# Patient Record
Sex: FEMALE | Race: WHITE | NOT HISPANIC OR LATINO | Employment: OTHER | URBAN - METROPOLITAN AREA
[De-identification: names, ages, dates, MRNs, and addresses within clinical notes are randomized per-mention and may not be internally consistent; named-entity substitution may affect disease eponyms.]

---

## 2022-10-29 PROBLEM — F02.80 ALZHEIMER'S DEMENTIA (HCC): Status: ACTIVE | Noted: 2022-10-29

## 2022-10-29 PROBLEM — E53.8 VITAMIN B12 DEFICIENCY: Status: ACTIVE | Noted: 2022-10-29

## 2022-10-29 PROBLEM — Z85.42 HISTORY OF UTERINE CANCER: Status: ACTIVE | Noted: 2022-10-29

## 2022-10-29 PROBLEM — G30.9 ALZHEIMER'S DEMENTIA (HCC): Status: ACTIVE | Noted: 2022-10-29

## 2023-01-25 ENCOUNTER — HOSPITAL ENCOUNTER (EMERGENCY)
Facility: MEDICAL CENTER | Age: 78
End: 2023-01-25
Attending: EMERGENCY MEDICINE
Payer: MEDICARE

## 2023-01-25 ENCOUNTER — APPOINTMENT (OUTPATIENT)
Dept: RADIOLOGY | Facility: MEDICAL CENTER | Age: 78
End: 2023-01-25
Attending: EMERGENCY MEDICINE
Payer: MEDICARE

## 2023-01-25 VITALS
OXYGEN SATURATION: 96 % | BODY MASS INDEX: 26.68 KG/M2 | WEIGHT: 141.31 LBS | HEART RATE: 77 BPM | HEIGHT: 61 IN | SYSTOLIC BLOOD PRESSURE: 150 MMHG | DIASTOLIC BLOOD PRESSURE: 68 MMHG | RESPIRATION RATE: 16 BRPM | TEMPERATURE: 98.1 F

## 2023-01-25 DIAGNOSIS — R55 NEAR SYNCOPE: ICD-10-CM

## 2023-01-25 LAB
ALBUMIN SERPL BCP-MCNC: 3.4 G/DL (ref 3.2–4.9)
ALBUMIN/GLOB SERPL: 1.7 G/DL
ALP SERPL-CCNC: 90 U/L (ref 30–99)
ALT SERPL-CCNC: 10 U/L (ref 2–50)
ANION GAP SERPL CALC-SCNC: 8 MMOL/L (ref 7–16)
APPEARANCE UR: CLEAR
AST SERPL-CCNC: 14 U/L (ref 12–45)
BASOPHILS # BLD AUTO: 0.3 % (ref 0–1.8)
BASOPHILS # BLD: 0.02 K/UL (ref 0–0.12)
BILIRUB SERPL-MCNC: 0.2 MG/DL (ref 0.1–1.5)
BILIRUB UR QL STRIP.AUTO: NEGATIVE
BUN SERPL-MCNC: 30 MG/DL (ref 8–22)
CALCIUM ALBUM COR SERPL-MCNC: 8.9 MG/DL (ref 8.5–10.5)
CALCIUM SERPL-MCNC: 8.4 MG/DL (ref 8.4–10.2)
CHLORIDE SERPL-SCNC: 108 MMOL/L (ref 96–112)
CO2 SERPL-SCNC: 24 MMOL/L (ref 20–33)
COLOR UR: YELLOW
CREAT SERPL-MCNC: 0.65 MG/DL (ref 0.5–1.4)
EKG IMPRESSION: NORMAL
EOSINOPHIL # BLD AUTO: 0.07 K/UL (ref 0–0.51)
EOSINOPHIL NFR BLD: 1.1 % (ref 0–6.9)
ERYTHROCYTE [DISTWIDTH] IN BLOOD BY AUTOMATED COUNT: 46.4 FL (ref 35.9–50)
GFR SERPLBLD CREATININE-BSD FMLA CKD-EPI: 89 ML/MIN/1.73 M 2
GLOBULIN SER CALC-MCNC: 2 G/DL (ref 1.9–3.5)
GLUCOSE SERPL-MCNC: 117 MG/DL (ref 65–99)
GLUCOSE UR STRIP.AUTO-MCNC: NEGATIVE MG/DL
HCT VFR BLD AUTO: 37.9 % (ref 37–47)
HGB BLD-MCNC: 12.7 G/DL (ref 12–16)
IMM GRANULOCYTES # BLD AUTO: 0.03 K/UL (ref 0–0.11)
IMM GRANULOCYTES NFR BLD AUTO: 0.5 % (ref 0–0.9)
KETONES UR STRIP.AUTO-MCNC: ABNORMAL MG/DL
LEUKOCYTE ESTERASE UR QL STRIP.AUTO: NEGATIVE
LYMPHOCYTES # BLD AUTO: 0.7 K/UL (ref 1–4.8)
LYMPHOCYTES NFR BLD: 10.6 % (ref 22–41)
MCH RBC QN AUTO: 31.6 PG (ref 27–33)
MCHC RBC AUTO-ENTMCNC: 33.5 G/DL (ref 33.6–35)
MCV RBC AUTO: 94.3 FL (ref 81.4–97.8)
MICRO URNS: ABNORMAL
MONOCYTES # BLD AUTO: 0.44 K/UL (ref 0–0.85)
MONOCYTES NFR BLD AUTO: 6.6 % (ref 0–13.4)
NEUTROPHILS # BLD AUTO: 5.36 K/UL (ref 2–7.15)
NEUTROPHILS NFR BLD: 80.9 % (ref 44–72)
NITRITE UR QL STRIP.AUTO: NEGATIVE
NRBC # BLD AUTO: 0 K/UL
NRBC BLD-RTO: 0 /100 WBC
PH UR STRIP.AUTO: 5.5 [PH] (ref 5–8)
PLATELET # BLD AUTO: 205 K/UL (ref 164–446)
PMV BLD AUTO: 10.4 FL (ref 9–12.9)
POTASSIUM SERPL-SCNC: 3.7 MMOL/L (ref 3.6–5.5)
PROT SERPL-MCNC: 5.4 G/DL (ref 6–8.2)
PROT UR QL STRIP: NEGATIVE MG/DL
RBC # BLD AUTO: 4.02 M/UL (ref 4.2–5.4)
RBC UR QL AUTO: NEGATIVE
SODIUM SERPL-SCNC: 140 MMOL/L (ref 135–145)
SP GR UR STRIP.AUTO: >=1.03
TROPONIN T SERPL-MCNC: 9 NG/L (ref 6–19)
TROPONIN T SERPL-MCNC: 9 NG/L (ref 6–19)
WBC # BLD AUTO: 6.6 K/UL (ref 4.8–10.8)

## 2023-01-25 PROCEDURE — 93005 ELECTROCARDIOGRAM TRACING: CPT | Performed by: EMERGENCY MEDICINE

## 2023-01-25 PROCEDURE — 93005 ELECTROCARDIOGRAM TRACING: CPT

## 2023-01-25 PROCEDURE — 36415 COLL VENOUS BLD VENIPUNCTURE: CPT

## 2023-01-25 PROCEDURE — 71045 X-RAY EXAM CHEST 1 VIEW: CPT

## 2023-01-25 PROCEDURE — 85025 COMPLETE CBC W/AUTO DIFF WBC: CPT

## 2023-01-25 PROCEDURE — 84484 ASSAY OF TROPONIN QUANT: CPT

## 2023-01-25 PROCEDURE — 81003 URINALYSIS AUTO W/O SCOPE: CPT

## 2023-01-25 PROCEDURE — 80053 COMPREHEN METABOLIC PANEL: CPT

## 2023-01-25 PROCEDURE — 99284 EMERGENCY DEPT VISIT MOD MDM: CPT

## 2023-01-25 RX ORDER — NAPROXEN SODIUM 220 MG
220 TABLET ORAL 2 TIMES DAILY PRN
Status: SHIPPED | COMMUNITY
End: 2023-08-05

## 2023-01-25 ASSESSMENT — FIBROSIS 4 INDEX: FIB4 SCORE: 2.22

## 2023-01-25 NOTE — ED PROVIDER NOTES
ED Provider Note    CHIEF COMPLAINT  Chief Complaint   Patient presents with    Near Syncopal     Pt. Endorses near syncopal episode this morning, endorses feeling dizzy, nauseous. Denies full syncopal episode. States she did eat breakfast. Denies CP or h/a currently or with episode.        EXTERNAL RECORDS REVIEWED  Outpatient Notes office visit of 10/28/2022 which was establishment of care up in [facility]    HPI/ROS  LIMITATION TO HISTORY   Select: : None  OUTSIDE HISTORIAN(S):  Family daughter at bedside    Jennifer Vuong is a 77 y.o. female who presents to the ED with concerns for near syncopal event.  The patient just moved to this area and is living with her daughter because of worsening memory problems.  Patient apparently does have a history of mild dementia that is progressive.  History of coronary disease as well as history of uterine cancer in the past.  The patient was doing very well today they went to [place] she had an episode where she just felt very weak dizzy and needed to sit down.  The patient did not feel well because of this the daughter was concerned and brought her immediately to the emergency department for evaluation.  Upon arrival here patient states that she is feeling better she does recollect the event denies any overt chest pain shortness of breath just felt very nauseated and very lightheaded.  Currently she denies any other symptoms.    PAST MEDICAL HISTORY   has a past medical history of Anxiety, Arthritis, Cancer (HCC), Cataract, Heart murmur, Hypertension, and Urinary tract infection.    SURGICAL HISTORY   has a past surgical history that includes arthroplasty and abdominal hysterectomy total.    FAMILY HISTORY  No family history on file.    SOCIAL HISTORY  Social History     Tobacco Use    Smoking status: Never    Smokeless tobacco: Never   Vaping Use    Vaping Use: Never used   Substance and Sexual Activity    Alcohol use: Yes     Alcohol/week: 0.6 oz      "Types: 1 Glasses of wine per week     Comment: once a week    Drug use: Never    Sexual activity: Not Currently       CURRENT MEDICATIONS  Home Medications       Reviewed by Anne Bang (Pharmacy Tech) on 01/25/23 at 1226  Med List Status: Complete     Medication Last Dose Status   aspirin EC (ECOTRIN) 81 MG Tablet Delayed Response 1/24/2023 Active   atorvastatin (LIPITOR) 20 MG Tab 1/24/2023 Active   Cyanocobalamin ([employer] B12 GUMMIES PO) 1/24/2023 Active   memantine (NAMENDA) 5 MG Tab 1/24/2023 Active   naproxen (ALEVE) 220 MG tablet > 3 days Active                    ALLERGIES  No Known Allergies    PHYSICAL EXAM  VITAL SIGNS: BP (!) 150/68   Pulse 77   Temp 36.7 °C (98.1 °F) (Temporal)   Resp 16   Ht 1.549 m (5' 1\")   Wt 64.1 kg (141 lb 5 oz)   SpO2 96%   BMI 26.70 kg/m²    Constitutional: Well-developed no acute distress elderly in appearance  HENT: Normocephalic, Atraumatic, Bilateral external ears normal.  Eyes:  conjunctiva are normal.   Neck: Supple.  Nontender midline  Cardiovascular: Regular rate and rhythm grade 3/6 murmur left sternal border no gallops or rubs.   Thorax & Lungs: No respiratory distress. Breathing comfortably. Lungs are clear to auscultation bilaterally, there are no wheezes no rales. Chest wall is nontender.  Abdomen: Soft, nontender nondistended.  Skin: Warm, Dry, No erythema,   Back: No tenderness, No CVA tenderness.  Musculoskeletal: No clubbing cyanosis or edema good range of motion   Neurologic: Alert & oriented x 3, normal sensation moving all extremities appears normal   Psychiatric: Affect normal, Judgment normal, Mood normal.       DIAGNOSTIC STUDIES / PROCEDURES  EKG  I have independently interpreted this EKG  Interpreted below by myself    LABS  Results for orders placed or performed during the hospital encounter of 01/25/23   CBC with Differential   Result Value Ref Range    WBC 6.6 4.8 - 10.8 K/uL    RBC 4.02 (L) 4.20 - 5.40 M/uL    Hemoglobin 12.7 12.0 - " 16.0 g/dL    Hematocrit 37.9 37.0 - 47.0 %    MCV 94.3 81.4 - 97.8 fL    MCH 31.6 27.0 - 33.0 pg    MCHC 33.5 (L) 33.6 - 35.0 g/dL    RDW 46.4 35.9 - 50.0 fL    Platelet Count 205 164 - 446 K/uL    MPV 10.4 9.0 - 12.9 fL    Neutrophils-Polys 80.90 (H) 44.00 - 72.00 %    Lymphocytes 10.60 (L) 22.00 - 41.00 %    Monocytes 6.60 0.00 - 13.40 %    Eosinophils 1.10 0.00 - 6.90 %    Basophils 0.30 0.00 - 1.80 %    Immature Granulocytes 0.50 0.00 - 0.90 %    Nucleated RBC 0.00 /100 WBC    Neutrophils (Absolute) 5.36 2.00 - 7.15 K/uL    Lymphs (Absolute) 0.70 (L) 1.00 - 4.80 K/uL    Monos (Absolute) 0.44 0.00 - 0.85 K/uL    Eos (Absolute) 0.07 0.00 - 0.51 K/uL    Baso (Absolute) 0.02 0.00 - 0.12 K/uL    Immature Granulocytes (abs) 0.03 0.00 - 0.11 K/uL    NRBC (Absolute) 0.00 K/uL   Complete Metabolic Panel (CMP)   Result Value Ref Range    Sodium 140 135 - 145 mmol/L    Potassium 3.7 3.6 - 5.5 mmol/L    Chloride 108 96 - 112 mmol/L    Co2 24 20 - 33 mmol/L    Anion Gap 8.0 7.0 - 16.0    Glucose 117 (H) 65 - 99 mg/dL    Bun 30 (H) 8 - 22 mg/dL    Creatinine 0.65 0.50 - 1.40 mg/dL    Calcium 8.4 8.4 - 10.2 mg/dL    AST(SGOT) 14 12 - 45 U/L    ALT(SGPT) 10 2 - 50 U/L    Alkaline Phosphatase 90 30 - 99 U/L    Total Bilirubin 0.2 0.1 - 1.5 mg/dL    Albumin 3.4 3.2 - 4.9 g/dL    Total Protein 5.4 (L) 6.0 - 8.2 g/dL    Globulin 2.0 1.9 - 3.5 g/dL    A-G Ratio 1.7 g/dL   Troponin STAT   Result Value Ref Range    Troponin T 9 6 - 19 ng/L   Troponin in two (2) hours   Result Value Ref Range    Troponin T 9 6 - 19 ng/L   URINALYSIS,CULTURE IF INDICATED    Specimen: Urine   Result Value Ref Range    Color Yellow     Character Clear     Specific Gravity >=1.030 <1.035    Ph 5.5 5.0 - 8.0    Glucose Negative Negative mg/dL    Ketones Trace (A) Negative mg/dL    Protein Negative Negative mg/dL    Bilirubin Negative Negative    Nitrite Negative Negative    Leukocyte Esterase Negative Negative    Occult Blood Negative Negative    Micro  Urine Req see below    CORRECTED CALCIUM   Result Value Ref Range    Correct Calcium 8.9 8.5 - 10.5 mg/dL   ESTIMATED GFR   Result Value Ref Range    GFR (CKD-EPI) 89 >60 mL/min/1.73 m 2   EKG   Result Value Ref Range    Report       [facility]    Test Date:  2023  Pt Name:    MARTINA CALDERA                   Department: [facility]  MRN:        [id number]                      [facility]  Gender:     Female                       Technician: DARA  :        [id number]                   Requested By:ER TRIAGE PROTOCOL  Order #:    [id number]                    Reading MD:    Measurements  Intervals                                Axis  Rate:       69                           P:          -22  MT:         159                          QRS:        -47  QRSD:       94                           T:          31  QT:         419  QTc:        449    Interpretive Statements  Sinus rhythm  Consider left atrial enlargement  Left anterior fascicular block  Left ventricular hypertrophy  No previous ECG available for comparison           RADIOLOGY  I have independently interpreted the diagnostic imaging associated with this visit and am waiting the final reading from the radiologist.   My preliminary interpretation is a follows: No acute infiltrate  DX-CHEST-PORTABLE (1 VIEW)   Final Result         No acute cardiopulmonary process.          COURSE & MEDICAL DECISION MAKING    ED Observation Status? Yes; I am placing the patient in to an observation status due to a diagnostic uncertainty as well as therapeutic intensity. Patient placed in observation status at 12:40 PM, 2023.     Observation plan is as follows: At this point the patient is otherwise hemodynamically stable we we will observe for any signs of ectopy while we work-up for the potential of cardiogenic causes for her near syncopal event I will order laboratory studies to evaluate for metabolic status and a urinalysis just to  evaluate for urinary tract infection.    Upon Reevaluation, the patient's condition has: Improved; and will be discharged.    Patient discharged from ED Observation status at 3:06PM (Time) 1/25/2023 (Date).     INITIAL ASSESSMENT AND PLAN  Care Narrative: Patient presents emerged part for evaluation.  Clinically the patient is otherwise appropriate does have some memory problems and is accordingly at baseline mental status per the daughter.  Sounds that the patient may have had a near syncopal event currently she is asymptomatic she is normal tensive on her blood pressure.  EKG laboratory studies were drawn no significant abnormalities on her laboratory studies her urinalysis is also normal.  At this point it could very well have been a vasovagal event.  Clinically she is otherwise appropriate and well and I do feel the patient is stable for discharge.  Her urine did have greater than 1.030 a just mild dehydration.  I did not give her a fluid bolus she is able to tolerate p.o. fluids and she is tolerating p.o. fluids.  At this point I do feel the patient is stable for discharge.    ADDITIONAL PROBLEM LIST AND DISPOSITION      FINAL DIAGNOSIS  1. Near syncope          The patient will return for new or worsening symptoms and is stable at the time of discharge.    The patient is referred to a primary physician for blood pressure management, diabetic screening, and for all other preventative health concerns.        DISPOSITION:  Patient will be discharged home in stable condition.    FOLLOW UP:  Sunita Serrano M.D.  [address] [facility] [id number] [phone]    Schedule an appointment as soon as possible for a visit in 1 week  For re-check, Return if any symptoms worsen      OUTPATIENT MEDICATIONS:  Discharge Medication List as of 1/25/2023  3:11 PM              Electronically signed by: Franco Terrell M.D., 1/25/2023 12:38 PM

## 2023-08-05 PROBLEM — I25.10 CORONARY ARTERY DISEASE INVOLVING NATIVE HEART WITHOUT ANGINA PECTORIS: Status: ACTIVE | Noted: 2023-08-05

## 2023-08-05 PROBLEM — I35.0 AORTIC VALVE STENOSIS: Status: ACTIVE | Noted: 2023-08-05

## 2023-08-05 PROBLEM — E78.5 HYPERLIPIDEMIA: Status: ACTIVE | Noted: 2023-08-05

## 2024-06-17 PROBLEM — F33.9 MDD (MAJOR DEPRESSIVE DISORDER), RECURRENT EPISODE (HCC): Status: ACTIVE | Noted: 2024-06-17

## 2024-06-17 PROBLEM — E46 PROTEIN CALORIE MALNUTRITION (HCC): Status: ACTIVE | Noted: 2024-06-17

## 2024-06-17 PROBLEM — E55.9 VITAMIN D DEFICIENCY: Status: ACTIVE | Noted: 2024-06-17

## 2024-06-19 ENCOUNTER — OFFICE VISIT (OUTPATIENT)
Dept: URGENT CARE | Facility: CLINIC | Age: 79
End: 2024-06-19
Payer: MEDICARE

## 2024-06-19 ENCOUNTER — HOSPITAL ENCOUNTER (OUTPATIENT)
Facility: MEDICAL CENTER | Age: 79
End: 2024-06-19
Attending: NURSE PRACTITIONER
Payer: MEDICARE

## 2024-06-19 VITALS
WEIGHT: 154 LBS | HEART RATE: 65 BPM | DIASTOLIC BLOOD PRESSURE: 58 MMHG | BODY MASS INDEX: 29.07 KG/M2 | RESPIRATION RATE: 16 BRPM | OXYGEN SATURATION: 97 % | SYSTOLIC BLOOD PRESSURE: 108 MMHG | HEIGHT: 61 IN | TEMPERATURE: 97.6 F

## 2024-06-19 DIAGNOSIS — R35.0 URINARY FREQUENCY: ICD-10-CM

## 2024-06-19 DIAGNOSIS — R30.0 DYSURIA: ICD-10-CM

## 2024-06-19 DIAGNOSIS — F02.80 ALZHEIMER'S DEMENTIA, UNSPECIFIED DEMENTIA SEVERITY, UNSPECIFIED TIMING OF DEMENTIA ONSET, UNSPECIFIED WHETHER BEHAVIORAL, PSYCHOTIC, OR MOOD DISTURBANCE OR ANXIETY (HCC): ICD-10-CM

## 2024-06-19 DIAGNOSIS — G30.9 ALZHEIMER'S DEMENTIA, UNSPECIFIED DEMENTIA SEVERITY, UNSPECIFIED TIMING OF DEMENTIA ONSET, UNSPECIFIED WHETHER BEHAVIORAL, PSYCHOTIC, OR MOOD DISTURBANCE OR ANXIETY (HCC): ICD-10-CM

## 2024-06-19 LAB
APPEARANCE UR: CLEAR
BILIRUB UR STRIP-MCNC: NORMAL MG/DL
COLOR UR AUTO: NORMAL
GLUCOSE UR STRIP.AUTO-MCNC: NORMAL MG/DL
KETONES UR STRIP.AUTO-MCNC: NORMAL MG/DL
LEUKOCYTE ESTERASE UR QL STRIP.AUTO: NORMAL
NITRITE UR QL STRIP.AUTO: NORMAL
PH UR STRIP.AUTO: 5.5 [PH] (ref 5–8)
PROT UR QL STRIP: NORMAL MG/DL
RBC UR QL AUTO: NORMAL
SP GR UR STRIP.AUTO: 1.01
UROBILINOGEN UR STRIP-MCNC: 0.2 MG/DL

## 2024-06-19 PROCEDURE — 3078F DIAST BP <80 MM HG: CPT | Performed by: NURSE PRACTITIONER

## 2024-06-19 PROCEDURE — 87086 URINE CULTURE/COLONY COUNT: CPT

## 2024-06-19 PROCEDURE — 81002 URINALYSIS NONAUTO W/O SCOPE: CPT | Performed by: NURSE PRACTITIONER

## 2024-06-19 PROCEDURE — 3074F SYST BP LT 130 MM HG: CPT | Performed by: NURSE PRACTITIONER

## 2024-06-19 PROCEDURE — 99213 OFFICE O/P EST LOW 20 MIN: CPT | Performed by: NURSE PRACTITIONER

## 2024-06-19 ASSESSMENT — FIBROSIS 4 INDEX: FIB4 SCORE: 1.67

## 2024-06-19 NOTE — PROGRESS NOTES
Jennifer Vuong is a 79 y.o. female who presents for UTI (X 2 days, frequency.)    BIB dtr  HPI  This is a new problem. Jennifer Vuong is a 79 y.o. patient who presents to urgent care with c/o: possible UTI. Having some discomfort with urination and frequency of urination. Dtr says she seems a little more confused with hx of Alzheimer's.  Her daughter reports that she does not always look for a bladder infection whenever they have a little bit of a change in mentation.  She is here today to make sure she does not have a urinary tract infection.  She is eating well.  There has been no fever or chills.  She is trying to stay well-hydrated.  She was in the emergency room last week for low blood pressure.    ROS See HPI    Allergies:     No Known Allergies    PMSFS Hx:  Past Medical History:   Diagnosis Date    Alzheimer disease (HCC)     Anxiety     Arthritis     Cancer (HCC)     Cataract     Heart murmur     Hyperlipidemia 8/5/2023    Hypertension     Urinary tract infection      Past Surgical History:   Procedure Laterality Date    ABDOMINAL HYSTERECTOMY TOTAL      ARTHROPLASTY       History reviewed. No pertinent family history.  Social History     Tobacco Use    Smoking status: Never    Smokeless tobacco: Never   Substance Use Topics    Alcohol use: Not Currently     Alcohol/week: 0.6 oz     Types: 1 Glasses of wine per week     Comment: once a week       Problems:   Patient Active Problem List   Diagnosis    Alzheimer's dementia (HCC)    History of uterine cancer    Vitamin B12 deficiency    Aortic valve stenosis    Coronary artery disease involving native heart without angina pectoris    Hyperlipidemia    Protein calorie malnutrition (HCC)    Vitamin D deficiency    MDD (major depressive disorder), recurrent episode (HCC)       Medications:   Current Outpatient Medications on File Prior to Visit   Medication Sig Dispense Refill    QUEtiapine (SEROQUEL) 25 MG Tab Take 25 mg by mouth every evening.       "acetaminophen (TYLENOL) 500 MG Tab Take 500-1,000 mg by mouth every 6 hours as needed for Mild Pain or Moderate Pain.      Nutritional Supplements (NUTRITIONAL SHAKE PLUS PROTEIN PO) Take 1 Each by mouth every day.      atorvastatin (LIPITOR) 20 MG Tab TAKE ONE TABLET BY MOUTH EVERY EVENING, NEEDS APPOINTMENT PRIOR TO ADDITIONAL REFILLS 90 Tablet 0    VITAMIN D, ERGOCALCIFEROL, PO Take  by mouth every day.      escitalopram (LEXAPRO) 20 MG tablet Take 1 tablet (20 mg) by mouth every morning.      memantine (NAMENDA) 10 MG Tab Take 1 tablet (10 mg) by mouth 2 times a day.      aspirin EC (ECOTRIN) 81 MG Tablet Delayed Response Take 81 mg by mouth every day.       No current facility-administered medications on file prior to visit.        Objective:     /58   Pulse 65   Temp 36.4 °C (97.6 °F) (Temporal)   Resp 16   Ht 1.549 m (5' 1\")   Wt 69.9 kg (154 lb)   LMP 04/02/1994 (Approximate)   SpO2 97%   BMI 29.10 kg/m²     Physical Exam  Vitals and nursing note reviewed.   Constitutional:       Appearance: Normal appearance. She is normal weight.   Cardiovascular:      Rate and Rhythm: Normal rate and regular rhythm.      Pulses: Normal pulses.      Heart sounds: Normal heart sounds.   Pulmonary:      Effort: Pulmonary effort is normal.      Breath sounds: Normal breath sounds.   Abdominal:      Tenderness: There is no right CVA tenderness or left CVA tenderness.   Skin:     General: Skin is warm.      Capillary Refill: Capillary refill takes less than 2 seconds.   Neurological:      Mental Status: She is alert and oriented to person, place, and time.   Psychiatric:         Mood and Affect: Mood normal.         Behavior: Behavior normal.         Thought Content: Thought content normal.         Assessment /Associated Orders:      1. Dysuria  POCT Urinalysis    URINE CULTURE(NEW)      2. Urinary frequency        3. Alzheimer's dementia, unspecified dementia severity, unspecified timing of dementia onset, " unspecified whether behavioral, psychotic, or mood disturbance or anxiety (HCC)              Medical Decision Making:    Jennifer is a  79 y.o. female who is clinically stable at today's acute urgent care visit.  She is accompanied by her dtr Lili. No acute distress noted.  VSS. Appropriate for outpatient care at this time.   Acute problem today with uncertain prognosis. UA: trace blood, otherwise negative for acute infection.   Urine culture: pending     Resume all prior  RX medications. Take as prescribed.   Keep well hydrated  Discussed Dx, management options (risks,benefits, and alternatives to planned treatment), natural progression and supportive care.  Expressed understanding and the treatment plan was agreed upon.   Questions were encouraged and answered   Return to urgent care prn if new or worsening sx or if there is no improvement in condition prn.    Educated in Red flags and indications to immediately call 911 or present to the Emergency Department.       Please note that this dictation was created using voice recognition software. I have worked with consultants from the vendor as well as technical experts from [facility] to optimize the interface. I have made every reasonable attempt to correct obvious errors, but I expect that there are errors of grammar and possibly content that I did not discover before finalizing the note.  This note was electronically signed by provider       242.9

## 2024-06-22 LAB
BACTERIA UR CULT: NORMAL
SIGNIFICANT IND 70042: NORMAL
SITE SITE: NORMAL
SOURCE SOURCE: NORMAL

## 2024-07-05 PROBLEM — J96.10 CHRONIC RESPIRATORY FAILURE DUE TO OBSTRUCTIVE SLEEP APNEA (HCC): Status: ACTIVE | Noted: 2024-07-05

## 2024-07-05 PROBLEM — G47.33 CHRONIC RESPIRATORY FAILURE DUE TO OBSTRUCTIVE SLEEP APNEA (HCC): Status: ACTIVE | Noted: 2024-07-05

## 2024-08-09 PROBLEM — I95.0 IDIOPATHIC HYPOTENSION: Status: ACTIVE | Noted: 2024-08-09

## 2024-08-09 PROBLEM — G47.00 INSOMNIA: Status: ACTIVE | Noted: 2024-08-09

## 2024-08-23 PROBLEM — F41.9 ANXIETY: Status: ACTIVE | Noted: 2024-08-23

## 2024-08-23 PROBLEM — R42 DIZZINESS: Status: ACTIVE | Noted: 2024-08-23

## 2024-09-04 ENCOUNTER — HOSPITAL ENCOUNTER (OUTPATIENT)
Facility: MEDICAL CENTER | Age: 79
End: 2024-09-04
Attending: NURSE PRACTITIONER
Payer: MEDICARE

## 2024-09-04 DIAGNOSIS — E53.8 VITAMIN B12 DEFICIENCY: ICD-10-CM

## 2024-09-04 DIAGNOSIS — E78.5 HYPERLIPIDEMIA, UNSPECIFIED HYPERLIPIDEMIA TYPE: ICD-10-CM

## 2024-09-04 DIAGNOSIS — E44.1 MILD PROTEIN-CALORIE MALNUTRITION (HCC): ICD-10-CM

## 2024-09-04 DIAGNOSIS — E55.9 VITAMIN D DEFICIENCY: ICD-10-CM

## 2024-09-04 LAB
25(OH)D3 SERPL-MCNC: 25 NG/ML (ref 30–100)
25(OH)D3 SERPL-MCNC: 25 NG/ML (ref 30–100)
ALBUMIN SERPL BCP-MCNC: 3.5 G/DL (ref 3.2–4.9)
ALBUMIN SERPL BCP-MCNC: 3.6 G/DL (ref 3.2–4.9)
ALBUMIN/GLOB SERPL: 1.7 G/DL
ALBUMIN/GLOB SERPL: 1.8 G/DL
ALP SERPL-CCNC: 69 U/L (ref 30–99)
ALP SERPL-CCNC: 71 U/L (ref 30–99)
ALT SERPL-CCNC: 17 U/L (ref 2–50)
ALT SERPL-CCNC: 18 U/L (ref 2–50)
ANION GAP SERPL CALC-SCNC: 10 MMOL/L (ref 7–16)
ANION GAP SERPL CALC-SCNC: 11 MMOL/L (ref 7–16)
AST SERPL-CCNC: 21 U/L (ref 12–45)
AST SERPL-CCNC: 22 U/L (ref 12–45)
BASOPHILS # BLD AUTO: 0.5 % (ref 0–1.8)
BASOPHILS # BLD AUTO: 1 % (ref 0–1.8)
BASOPHILS # BLD: 0.02 K/UL (ref 0–0.12)
BASOPHILS # BLD: 0.04 K/UL (ref 0–0.12)
BILIRUB SERPL-MCNC: 0.7 MG/DL (ref 0.1–1.5)
BILIRUB SERPL-MCNC: 0.7 MG/DL (ref 0.1–1.5)
BUN SERPL-MCNC: 16 MG/DL (ref 8–22)
BUN SERPL-MCNC: 16 MG/DL (ref 8–22)
CALCIUM ALBUM COR SERPL-MCNC: 9.1 MG/DL (ref 8.5–10.5)
CALCIUM ALBUM COR SERPL-MCNC: 9.1 MG/DL (ref 8.5–10.5)
CALCIUM SERPL-MCNC: 8.7 MG/DL (ref 8.5–10.5)
CALCIUM SERPL-MCNC: 8.8 MG/DL (ref 8.5–10.5)
CHLORIDE SERPL-SCNC: 106 MMOL/L (ref 96–112)
CHLORIDE SERPL-SCNC: 106 MMOL/L (ref 96–112)
CHOLEST SERPL-MCNC: 120 MG/DL (ref 100–199)
CHOLEST SERPL-MCNC: 120 MG/DL (ref 100–199)
CO2 SERPL-SCNC: 25 MMOL/L (ref 20–33)
CO2 SERPL-SCNC: 26 MMOL/L (ref 20–33)
CREAT SERPL-MCNC: 0.51 MG/DL (ref 0.5–1.4)
CREAT SERPL-MCNC: 0.53 MG/DL (ref 0.5–1.4)
EOSINOPHIL # BLD AUTO: 0.15 K/UL (ref 0–0.51)
EOSINOPHIL # BLD AUTO: 0.17 K/UL (ref 0–0.51)
EOSINOPHIL NFR BLD: 3.6 % (ref 0–6.9)
EOSINOPHIL NFR BLD: 4.3 % (ref 0–6.9)
ERYTHROCYTE [DISTWIDTH] IN BLOOD BY AUTOMATED COUNT: 50.4 FL (ref 35.9–50)
ERYTHROCYTE [DISTWIDTH] IN BLOOD BY AUTOMATED COUNT: 51.6 FL (ref 35.9–50)
GFR SERPLBLD CREATININE-BSD FMLA CKD-EPI: 93 ML/MIN/1.73 M 2
GFR SERPLBLD CREATININE-BSD FMLA CKD-EPI: 94 ML/MIN/1.73 M 2
GLOBULIN SER CALC-MCNC: 2 G/DL (ref 1.9–3.5)
GLOBULIN SER CALC-MCNC: 2.1 G/DL (ref 1.9–3.5)
GLUCOSE SERPL-MCNC: 73 MG/DL (ref 65–99)
GLUCOSE SERPL-MCNC: 73 MG/DL (ref 65–99)
HCT VFR BLD AUTO: 42.6 % (ref 37–47)
HCT VFR BLD AUTO: 43.8 % (ref 37–47)
HDLC SERPL-MCNC: 45 MG/DL
HDLC SERPL-MCNC: 45 MG/DL
HGB BLD-MCNC: 13.7 G/DL (ref 12–16)
HGB BLD-MCNC: 14.2 G/DL (ref 12–16)
IMM GRANULOCYTES # BLD AUTO: 0.01 K/UL (ref 0–0.11)
IMM GRANULOCYTES # BLD AUTO: 0.01 K/UL (ref 0–0.11)
IMM GRANULOCYTES NFR BLD AUTO: 0.2 % (ref 0–0.9)
IMM GRANULOCYTES NFR BLD AUTO: 0.3 % (ref 0–0.9)
LDLC SERPL CALC-MCNC: 64 MG/DL
LDLC SERPL CALC-MCNC: 64 MG/DL
LYMPHOCYTES # BLD AUTO: 0.91 K/UL (ref 1–4.8)
LYMPHOCYTES # BLD AUTO: 0.92 K/UL (ref 1–4.8)
LYMPHOCYTES NFR BLD: 22.1 % (ref 22–41)
LYMPHOCYTES NFR BLD: 22.9 % (ref 22–41)
MCH RBC QN AUTO: 31 PG (ref 27–33)
MCH RBC QN AUTO: 31.7 PG (ref 27–33)
MCHC RBC AUTO-ENTMCNC: 32.2 G/DL (ref 32.2–35.5)
MCHC RBC AUTO-ENTMCNC: 32.4 G/DL (ref 32.2–35.5)
MCV RBC AUTO: 96.4 FL (ref 81.4–97.8)
MCV RBC AUTO: 97.8 FL (ref 81.4–97.8)
MONOCYTES # BLD AUTO: 0.29 K/UL (ref 0–0.85)
MONOCYTES # BLD AUTO: 0.3 K/UL (ref 0–0.85)
MONOCYTES NFR BLD AUTO: 7.2 % (ref 0–13.4)
MONOCYTES NFR BLD AUTO: 7.3 % (ref 0–13.4)
NEUTROPHILS # BLD AUTO: 2.58 K/UL (ref 1.82–7.42)
NEUTROPHILS # BLD AUTO: 2.74 K/UL (ref 1.82–7.42)
NEUTROPHILS NFR BLD: 64.7 % (ref 44–72)
NEUTROPHILS NFR BLD: 65.9 % (ref 44–72)
NRBC # BLD AUTO: 0 K/UL
NRBC # BLD AUTO: 0 K/UL
NRBC BLD-RTO: 0 /100 WBC (ref 0–0.2)
NRBC BLD-RTO: 0 /100 WBC (ref 0–0.2)
PLATELET # BLD AUTO: 233 K/UL (ref 164–446)
PLATELET # BLD AUTO: 235 K/UL (ref 164–446)
PMV BLD AUTO: 10.5 FL (ref 9–12.9)
PMV BLD AUTO: 10.7 FL (ref 9–12.9)
POTASSIUM SERPL-SCNC: 4.1 MMOL/L (ref 3.6–5.5)
POTASSIUM SERPL-SCNC: 4.1 MMOL/L (ref 3.6–5.5)
PROT SERPL-MCNC: 5.6 G/DL (ref 6–8.2)
PROT SERPL-MCNC: 5.6 G/DL (ref 6–8.2)
RBC # BLD AUTO: 4.42 M/UL (ref 4.2–5.4)
RBC # BLD AUTO: 4.48 M/UL (ref 4.2–5.4)
SODIUM SERPL-SCNC: 142 MMOL/L (ref 135–145)
SODIUM SERPL-SCNC: 142 MMOL/L (ref 135–145)
T4 FREE SERPL-MCNC: 1.23 NG/DL (ref 0.93–1.7)
T4 SERPL-MCNC: 6.4 UG/DL (ref 4–12)
TRIGL SERPL-MCNC: 55 MG/DL (ref 0–149)
TRIGL SERPL-MCNC: 55 MG/DL (ref 0–149)
TSH SERPL-ACNC: 2.36 UIU/ML (ref 0.35–5.5)
TSH SERPL-ACNC: 2.39 UIU/ML (ref 0.35–5.5)
VIT B12 SERPL-MCNC: 570 PG/ML (ref 211–911)
VIT B12 SERPL-MCNC: 579 PG/ML (ref 211–911)
WBC # BLD AUTO: 4 K/UL (ref 4.8–10.8)
WBC # BLD AUTO: 4.2 K/UL (ref 4.8–10.8)

## 2024-09-04 PROCEDURE — 85025 COMPLETE CBC W/AUTO DIFF WBC: CPT

## 2024-09-04 PROCEDURE — 80061 LIPID PANEL: CPT

## 2024-09-04 PROCEDURE — 80053 COMPREHEN METABOLIC PANEL: CPT | Mod: 91

## 2024-09-04 PROCEDURE — 82607 VITAMIN B-12: CPT

## 2024-09-04 PROCEDURE — 83036 HEMOGLOBIN GLYCOSYLATED A1C: CPT

## 2024-09-04 PROCEDURE — 80061 LIPID PANEL: CPT | Mod: 91

## 2024-09-04 PROCEDURE — 84439 ASSAY OF FREE THYROXINE: CPT

## 2024-09-04 PROCEDURE — 82306 VITAMIN D 25 HYDROXY: CPT

## 2024-09-04 PROCEDURE — 85025 COMPLETE CBC W/AUTO DIFF WBC: CPT | Mod: 91

## 2024-09-04 PROCEDURE — 84436 ASSAY OF TOTAL THYROXINE: CPT

## 2024-09-04 PROCEDURE — 82306 VITAMIN D 25 HYDROXY: CPT | Mod: 91

## 2024-09-04 PROCEDURE — 84443 ASSAY THYROID STIM HORMONE: CPT

## 2024-09-04 PROCEDURE — 82607 VITAMIN B-12: CPT | Mod: 91

## 2024-09-04 PROCEDURE — 80053 COMPREHEN METABOLIC PANEL: CPT

## 2024-09-04 PROCEDURE — 84443 ASSAY THYROID STIM HORMONE: CPT | Mod: 91

## 2024-09-05 LAB
EST. AVERAGE GLUCOSE BLD GHB EST-MCNC: 111 MG/DL
HBA1C MFR BLD: 5.5 % (ref 4–5.6)

## 2024-09-13 ENCOUNTER — HOSPITAL ENCOUNTER (OUTPATIENT)
Facility: MEDICAL CENTER | Age: 79
End: 2024-09-13
Attending: NURSE PRACTITIONER
Payer: MEDICARE

## 2024-09-13 DIAGNOSIS — N39.0 URINARY TRACT INFECTION WITHOUT HEMATURIA, SITE UNSPECIFIED: ICD-10-CM

## 2024-09-13 PROCEDURE — 87086 URINE CULTURE/COLONY COUNT: CPT

## 2024-09-16 LAB
BACTERIA UR CULT: NORMAL
SIGNIFICANT IND 70042: NORMAL
SITE SITE: NORMAL
SOURCE SOURCE: NORMAL

## 2024-09-26 ENCOUNTER — APPOINTMENT (OUTPATIENT)
Dept: RADIOLOGY | Facility: MEDICAL CENTER | Age: 79
End: 2024-09-26
Attending: EMERGENCY MEDICINE
Payer: MEDICARE

## 2024-09-26 ENCOUNTER — HOSPITAL ENCOUNTER (EMERGENCY)
Facility: MEDICAL CENTER | Age: 79
End: 2024-09-26
Attending: EMERGENCY MEDICINE
Payer: MEDICARE

## 2024-09-26 VITALS
SYSTOLIC BLOOD PRESSURE: 139 MMHG | WEIGHT: 135 LBS | RESPIRATION RATE: 18 BRPM | BODY MASS INDEX: 25.49 KG/M2 | DIASTOLIC BLOOD PRESSURE: 64 MMHG | OXYGEN SATURATION: 97 % | HEART RATE: 51 BPM | HEIGHT: 61 IN | TEMPERATURE: 97.3 F

## 2024-09-26 DIAGNOSIS — R60.0 EDEMA OF RIGHT LOWER EXTREMITY: ICD-10-CM

## 2024-09-26 LAB
ALBUMIN SERPL BCP-MCNC: 3.3 G/DL (ref 3.2–4.9)
ALBUMIN/GLOB SERPL: 1.6 G/DL
ALP SERPL-CCNC: 62 U/L (ref 30–99)
ALT SERPL-CCNC: 16 U/L (ref 2–50)
ANION GAP SERPL CALC-SCNC: 7 MMOL/L (ref 7–16)
APPEARANCE UR: CLEAR
AST SERPL-CCNC: 26 U/L (ref 12–45)
BASOPHILS # BLD AUTO: 0.4 % (ref 0–1.8)
BASOPHILS # BLD: 0.02 K/UL (ref 0–0.12)
BILIRUB SERPL-MCNC: 0.5 MG/DL (ref 0.1–1.5)
BILIRUB UR QL STRIP.AUTO: NEGATIVE
BUN SERPL-MCNC: 17 MG/DL (ref 8–22)
CALCIUM ALBUM COR SERPL-MCNC: 9.4 MG/DL (ref 8.5–10.5)
CALCIUM SERPL-MCNC: 8.8 MG/DL (ref 8.5–10.5)
CHLORIDE SERPL-SCNC: 108 MMOL/L (ref 96–112)
CO2 SERPL-SCNC: 25 MMOL/L (ref 20–33)
COLOR UR: YELLOW
CREAT SERPL-MCNC: 0.71 MG/DL (ref 0.5–1.4)
EOSINOPHIL # BLD AUTO: 0.11 K/UL (ref 0–0.51)
EOSINOPHIL NFR BLD: 2 % (ref 0–6.9)
ERYTHROCYTE [DISTWIDTH] IN BLOOD BY AUTOMATED COUNT: 48.9 FL (ref 35.9–50)
GFR SERPLBLD CREATININE-BSD FMLA CKD-EPI: 85 ML/MIN/1.73 M 2
GLOBULIN SER CALC-MCNC: 2.1 G/DL (ref 1.9–3.5)
GLUCOSE SERPL-MCNC: 120 MG/DL (ref 65–99)
GLUCOSE UR STRIP.AUTO-MCNC: NEGATIVE MG/DL
HCT VFR BLD AUTO: 36.8 % (ref 37–47)
HGB BLD-MCNC: 12.1 G/DL (ref 12–16)
IMM GRANULOCYTES # BLD AUTO: 0.02 K/UL (ref 0–0.11)
IMM GRANULOCYTES NFR BLD AUTO: 0.4 % (ref 0–0.9)
KETONES UR STRIP.AUTO-MCNC: NEGATIVE MG/DL
LEUKOCYTE ESTERASE UR QL STRIP.AUTO: NEGATIVE
LYMPHOCYTES # BLD AUTO: 0.72 K/UL (ref 1–4.8)
LYMPHOCYTES NFR BLD: 12.9 % (ref 22–41)
MCH RBC QN AUTO: 31.2 PG (ref 27–33)
MCHC RBC AUTO-ENTMCNC: 32.9 G/DL (ref 32.2–35.5)
MCV RBC AUTO: 94.8 FL (ref 81.4–97.8)
MICRO URNS: NORMAL
MONOCYTES # BLD AUTO: 0.4 K/UL (ref 0–0.85)
MONOCYTES NFR BLD AUTO: 7.2 % (ref 0–13.4)
NEUTROPHILS # BLD AUTO: 4.3 K/UL (ref 1.82–7.42)
NEUTROPHILS NFR BLD: 77.1 % (ref 44–72)
NITRITE UR QL STRIP.AUTO: NEGATIVE
NRBC # BLD AUTO: 0 K/UL
NRBC BLD-RTO: 0 /100 WBC (ref 0–0.2)
NT-PROBNP SERPL IA-MCNC: 1231 PG/ML (ref 0–125)
PH UR STRIP.AUTO: 7 [PH] (ref 5–8)
PLATELET # BLD AUTO: 190 K/UL (ref 164–446)
PMV BLD AUTO: 10 FL (ref 9–12.9)
POTASSIUM SERPL-SCNC: 4 MMOL/L (ref 3.6–5.5)
PROT SERPL-MCNC: 5.4 G/DL (ref 6–8.2)
PROT UR QL STRIP: NEGATIVE MG/DL
RBC # BLD AUTO: 3.88 M/UL (ref 4.2–5.4)
RBC UR QL AUTO: NEGATIVE
SODIUM SERPL-SCNC: 140 MMOL/L (ref 135–145)
SP GR UR STRIP.AUTO: 1.01
UROBILINOGEN UR STRIP.AUTO-MCNC: 0.2 MG/DL
WBC # BLD AUTO: 5.6 K/UL (ref 4.8–10.8)

## 2024-09-26 PROCEDURE — 83880 ASSAY OF NATRIURETIC PEPTIDE: CPT

## 2024-09-26 PROCEDURE — 73590 X-RAY EXAM OF LOWER LEG: CPT | Mod: RT

## 2024-09-26 PROCEDURE — 85025 COMPLETE CBC W/AUTO DIFF WBC: CPT

## 2024-09-26 PROCEDURE — 81003 URINALYSIS AUTO W/O SCOPE: CPT

## 2024-09-26 PROCEDURE — 36415 COLL VENOUS BLD VENIPUNCTURE: CPT

## 2024-09-26 PROCEDURE — 99285 EMERGENCY DEPT VISIT HI MDM: CPT

## 2024-09-26 PROCEDURE — 71045 X-RAY EXAM CHEST 1 VIEW: CPT

## 2024-09-26 PROCEDURE — 80053 COMPREHEN METABOLIC PANEL: CPT

## 2024-09-26 PROCEDURE — 73552 X-RAY EXAM OF FEMUR 2/>: CPT | Mod: RT

## 2024-09-26 PROCEDURE — 93971 EXTREMITY STUDY: CPT | Mod: RT

## 2024-09-26 ASSESSMENT — FIBROSIS 4 INDEX: FIB4 SCORE: 1.77

## 2024-09-26 NOTE — ED TRIAGE NOTES
"Chief Complaint   Patient presents with    Peripheral Edema     Swelling BL lower extremities, worse on R leg, hx pedal edema, from SNF, Aox1 baseline hx dementia        BIB [facility] for above complaint. EMS vitals 98/60 HR 60 98% RA [id number]    /61   Pulse 64   Temp 36.3 °C (97.3 °F) (Temporal)   Resp 18   Ht 1.549 m (5' 1\")   Wt 61.2 kg (135 lb)   LMP 04/02/1994 (Approximate)   SpO2 96%   BMI 25.51 kg/m²      "

## 2024-09-26 NOTE — DISCHARGE INSTRUCTIONS
Elevate right leg is much as possible.  This will help with the swelling.    Wear compression stocking on the right leg.  This will help with swelling.    Your geriatric specialty care nurse practitioner will be by to see you on Monday.    Return to the ER for any worsening leg swelling, for redness or warmth overlying the leg, increasing leg pain, for shortness of breath, chest pain, fevers over 100.4, decreased ability to ambulate, discoloration to the leg, numbness or tingling to the leg, or for any concerns.

## 2024-09-26 NOTE — ED NOTES
Pt discharged, all appropriate hospital equipment removed (IV, monitor, pulse ox, etc.). Pt left unit via wc with staff to vehicle for ride to SNF via family (granddaughter). Personal belongings with pt when leaving unit. Pt given discharge instructions prior to leaving ER, including where to  prescriptions and when to follow-up if applicable; verbalizes understanding. Pt informed to return to ED if symptoms worsen/return or altered status develop. Copy of discharge instructions signed and turned into DC basket and copy sent with pt. F/u with PCP

## 2024-09-26 NOTE — ED PROVIDER NOTES
ER Provider Note    Scribed for Kaylie Montilla M.d. by Trish Hanna. 9/26/2024  11:06 AM    Primary Care Provider: QUENTIN Qureshi    CHIEF COMPLAINT   Chief Complaint   Patient presents with    Peripheral Edema     Swelling BL lower extremities, worse on R leg, hx pedal edema, from SNF, Aox1 baseline hx dementia     EXTERNAL RECORDS REVIEWED  Outpatient Notes The patient[facility] ordered an ultrasound of her leg, but it couldn't be done. Her leg is still swollen and painful, so she was told to come to the ER. It is reported that the leg was swollen from the foot to the mid calf. The patient was last seen by the nurse practioner on September 9th. She is a full code. She has history of Alzheimer's, hypertension and hyperlipidemia.     Patient was seen by cardiology at [facility] January 30, 2024.  She has history of coronary artery disease, hyperlipidemia, aortic valve stenosis.  Echocardiogram done prior to that visit shows an ejection fraction of 60%, moderate aortic valve stenosis, mitral valve with moderate regurg.  They recommended a repeat echocardiogram in 1 year.    HPI/ROS  LIMITATION TO HISTORY   Select: Altered mental status / Confusion  OUTSIDE HISTORIAN(S):  Family  at bedside to confirm sequence of events and collateral information provided. See HPI below.     Jennifer Vuong is a 79 y.o. female who presents to the ED via EMS from [facility] for evaluation of bilateral leg swelling; right worse than left. The patient's family describes that the patient typically has some mild swelling to both her legs, but was notified this morning by the patient's caregiver that the patient's right leg is increasingly swollen.  However, review of the patient's chart reveals that her primary care physician was told about the right leg swelling on September 23 and ordered an ultrasound at that time.  The patient's family reports that the patient  previously denied any leg pain, but at this time, the patient elicits pain with palpation. The family notes that the patient has been able to ambulate today. The patient's family denies any recent falls or traumatic injury to the area. The patient denies any shortness of breath or cough.  No chest pain.  The patient's family states that the patient is at her baseline. The patient denies any history of DVT.  Family reports that group home staff has been encouraging patient to drink lots of fluids lately because she had some issues with low blood pressure a while back.  Patient has history of Alzheimer's.  She is typically A and O x 1 at baseline.  She cannot provide much history given her dementia.    PAST MEDICAL HISTORY  Past Medical History:   Diagnosis Date    Alzheimer disease (HCC)     Anxiety     Arthritis     Cancer (HCC)     Cataract     Heart murmur     Hyperlipidemia 8/5/2023    Hypertension     Urinary tract infection      SURGICAL HISTORY  Past Surgical History:   Procedure Laterality Date    ABDOMINAL HYSTERECTOMY TOTAL      ARTHROPLASTY       FAMILY HISTORY  History reviewed. No pertinent family history.    SOCIAL HISTORY   reports that she has never smoked. She has never used smokeless tobacco. She reports that she does not currently use alcohol after a past usage of about 0.6 oz of alcohol per week. She reports that she does not use drugs.    CURRENT MEDICATIONS  Previous Medications    ACETAMINOPHEN (TYLENOL) 500 MG TAB    Take 1 Tablet by mouth every 6 hours as needed for Mild Pain or Moderate Pain.    ATORVASTATIN (LIPITOR) 20 MG TAB    Take 1 Tablet by mouth at bedtime.    CHOLECALCIFEROL (VITAMIN D) 125 MCG (5000 UT) CAP    Take 1 Capsule by mouth every day.    ESCITALOPRAM (LEXAPRO) 20 MG TABLET    Take 1 Tablet by mouth at bedtime. (Change time to bedtime)    MELATONIN 5 MG TAB    Take 1 Tablet by mouth every evening. May also take 1 Tablet at bedtime as needed (as needed after taking routine  "dose if ineffective).    MEMANTINE (NAMENDA) 5 MG TAB    Take 1 Tablet by mouth 2 times a day.    MIDODRINE (PROAMATINE) 2.5 MG TAB    Take 1 Tablet by mouth in the morning, at noon, and at bedtime.    NUTRITIONAL SUPPLEMENTS (NUTRITIONAL SHAKE PLUS PROTEIN) LIQUID    Take 1 Container by mouth every day.    POLYETHYLENE GLYCOL 3350 (MIRALAX) 17 GM/SCOOP POWDER    Take 17 g by mouth 1 time a day as needed (constipation).    QUETIAPINE (SEROQUEL) 50 MG TABLET    Take 1 Tablet by mouth 2 times a day.     ALLERGIES  Patient has no known allergies.      PHYSICAL EXAM  /61   Pulse 64   Temp 36.3 °C (97.3 °F) (Temporal)   Resp 18   Ht 1.549 m (5' 1\")   Wt 61.2 kg (135 lb)   LMP 04/02/1994 (Approximate)   SpO2 96%   BMI 25.51 kg/m²       Constitutional: Alert in no apparent distress.  HENT: Normocephalic, Bilateral external ears normal. Nose normal.   Eyes: Pupils are equal and reactive. Conjunctiva normal, non-icteric.   Thorax & Lungs: Easy unlabored respirations.  Decreased breath sounds throughout but poor effort.  This regular rate and rhythm without murmurs rubs or gallops.  Abdomen: Soft and nontender.  Skin: Visualized skin is  Dry, No erythema, No rash.   Extremities: Patient has 1-2+ pitting edema to the right lower extremity.  There is trace pitting edema to the left lower extremity.  Healed knee replacement scars bilaterally.  There is a small bruise overlying the medial aspect of the right knee.  There is no pain with flexion, extension, internal/external rotation of the right hip.  No pain with flexion extension of the knee.  No warmth or erythema overlying the right knee.  No  limitation with flexion extension of the right knee.  2+ pedal pulses equal bilaterally.  No evidence of erythema, warmth or induration to the skin of the right lower extremity. 2+ femoral pulse without any obvious inguinal adenopathy.    Neurologic: Alert, awake.  Moves all extremities of full range of motion.  No facial " asymmetry.  Pleasant.  Cooperative.  Psychiatric: Affect and Mood normal      DIAGNOSTIC STUDIES    EKG/LABS  Results for orders placed or performed during the hospital encounter of 09/26/24   CBC WITH DIFFERENTIAL   Result Value Ref Range    WBC 5.6 4.8 - 10.8 K/uL    RBC 3.88 (L) 4.20 - 5.40 M/uL    Hemoglobin 12.1 12.0 - 16.0 g/dL    Hematocrit 36.8 (L) 37.0 - 47.0 %    MCV 94.8 81.4 - 97.8 fL    MCH 31.2 27.0 - 33.0 pg    MCHC 32.9 32.2 - 35.5 g/dL    RDW 48.9 35.9 - 50.0 fL    Platelet Count 190 164 - 446 K/uL    MPV 10.0 9.0 - 12.9 fL    Neutrophils-Polys 77.10 (H) 44.00 - 72.00 %    Lymphocytes 12.90 (L) 22.00 - 41.00 %    Monocytes 7.20 0.00 - 13.40 %    Eosinophils 2.00 0.00 - 6.90 %    Basophils 0.40 0.00 - 1.80 %    Immature Granulocytes 0.40 0.00 - 0.90 %    Nucleated RBC 0.00 0.00 - 0.20 /100 WBC    Neutrophils (Absolute) 4.30 1.82 - 7.42 K/uL    Lymphs (Absolute) 0.72 (L) 1.00 - 4.80 K/uL    Monos (Absolute) 0.40 0.00 - 0.85 K/uL    Eos (Absolute) 0.11 0.00 - 0.51 K/uL    Baso (Absolute) 0.02 0.00 - 0.12 K/uL    Immature Granulocytes (abs) 0.02 0.00 - 0.11 K/uL    NRBC (Absolute) 0.00 K/uL   COMP METABOLIC PANEL   Result Value Ref Range    Sodium 140 135 - 145 mmol/L    Potassium 4.0 3.6 - 5.5 mmol/L    Chloride 108 96 - 112 mmol/L    Co2 25 20 - 33 mmol/L    Anion Gap 7.0 7.0 - 16.0    Glucose 120 (H) 65 - 99 mg/dL    Bun 17 8 - 22 mg/dL    Creatinine 0.71 0.50 - 1.40 mg/dL    Calcium 8.8 8.5 - 10.5 mg/dL    Correct Calcium 9.4 8.5 - 10.5 mg/dL    AST(SGOT) 26 12 - 45 U/L    ALT(SGPT) 16 2 - 50 U/L    Alkaline Phosphatase 62 30 - 99 U/L    Total Bilirubin 0.5 0.1 - 1.5 mg/dL    Albumin 3.3 3.2 - 4.9 g/dL    Total Protein 5.4 (L) 6.0 - 8.2 g/dL    Globulin 2.1 1.9 - 3.5 g/dL    A-G Ratio 1.6 g/dL   ESTIMATED GFR   Result Value Ref Range    GFR (CKD-EPI) 85 >60 mL/min/1.73 m 2   proBrain Natriuretic Peptide, NT   Result Value Ref Range    NT-proBNP 1231 (H) 0 - 125 pg/mL   URINALYSIS (UA)    Specimen:  Urine   Result Value Ref Range    Color Yellow     Character Clear     Specific Gravity 1.006 <1.035    Ph 7.0 5.0 - 8.0    Glucose Negative Negative mg/dL    Ketones Negative Negative mg/dL    Protein Negative Negative mg/dL    Bilirubin Negative Negative    Urobilinogen, Urine 0.2 Negative    Nitrite Negative Negative    Leukocyte Esterase Negative Negative    Occult Blood Negative Negative    Micro Urine Req see below           RADIOLOGY/PROCEDURES   The attending emergency physician has independently interpreted the diagnostic imaging associated with this visit and am waiting the final reading from the radiologist.     My preliminary interpretation is a follows: There is some slight increased interstitial markings.  No focal infiltrate.    Radiologist interpretation:  DX-TIBIA AND FIBULA RIGHT   Final Result      1.  Demineralization and postsurgical change without acute fracture or malalignment.   2.  Diffuse lower leg soft tissue edema.      DX-FEMUR-2+ RIGHT   Final Result      Demineralization and postsurgical change without acute fracture or malalignment.      DX-CHEST-PORTABLE (1 VIEW)   Final Result      1.  Mild interstitial edema.   2.  Mild enlargement of the cardiomediastinal silhouette.      US-EXTREMITY VENOUS LOWER UNILAT RIGHT   Final Result               Vascular Laboratory   CONCLUSIONS   Normal right lower extremity superficial and deep venous examination.    Edema in the right lower leg soft tissues.       COURSE & MEDICAL DECISION MAKING     ASSESSMENT, COURSE AND PLAN  Care Narrative: Patient presents to the ER complaining of right lower extremity swelling over the last 2 to 3 days.  Granddaughter and daughter report that patient's legs are always a little bit swollen.  However, the right leg has been much more swollen than normal over the last 3 days.  No known trauma or injury but the patient has Alzheimer's and has very poor short-term memory.  Left leg looks normal for patient.  Right  leg is edematous.  There is no erythema or induration.  No warmth.  No concern for cellulitis.  Ultrasound was performed and is negative for DVT.  Patient has good pedal pulses.  No concern for arterial compromise.  She has no pain with range of motion of the hip or the knee.  No concern for fractures.  X-rays are negative.  There is no warmth or erythema overlying the knee and patient has good flexion extension of the knee.  No concern for septic joint.  She does not have any obvious inguinal adenopathy.  Lab work reveals normal kidney function and liver function.  No proteinuria.  She has an elevated BNP of 1231.  We have no other BMPs in the computer and no old BMP to compare.  She had an echocardiogram done within the last year and is followed by cardiology at [facility].  Echocardiogram shows an EF of 60 to 65%.  The patient has no chest pain.  No shortness of breath.  Her lungs are clear.  Her O2 sat is 97% on room air.  At this time she is not symptomatic.  I do not think she needs to be admitted for an isolated number of 1231 she has no other symptoms.  Her chest x-ray reveals mild interstitial edema.  With unilateral leg swelling, low suspicion CHF.  I spoke with the patient's primary care APRN.  She will follow the patient up this coming Monday (4 days from now.) at that time she will decide if patient needs an outpatient echocardiogram sooner than is recommended by the patient's cardiologist, and she will also decide if patient might benefit from a diuretic at that time.  At this time I think the patient is safe and stable for outpatient management discharge home.  She has been given strict return precautions and discharge instructions and she understands treatment plan and follow-up.    11:06 AM - Patient seen and examined at bedside. This is a 79 year old woman who has history of Alzheimer's is brought in to the emergency department via EMS from [facility] for evaluation for bilateral leg swelling, right greater  than left. The patient's family received a text message from the patient's caregiver this morning in regards to the patient's lower extremity swelling. The patient had an ultrasound ordered by her geriatric care specialist, but was unable to do the ultrasound, so she was sent here for imaging. Discussed plan of care, including performing lab work and imaging. Patient and her family agree to the plan of care.     1345: Discussed with patient's [facility] APRN, Robe Alexander.  She will see the patient on Monday.  She says she will reevaluate the patient's leg and reevaluate her.  We discussed possibly ordering another echocardiogram as an outpatient and discussed possibly needing a diuretic as an outpatient.    ADDITIONAL PROBLEM LIST  Problem #1: Right lower extremity swelling x 2 to 3 days    DISPOSITION AND DISCUSSIONS  I have discussed management of the patient with the following physicians and GRIS's: Patient's geriatric specialty care APRN    Discussion of management with other QHP or appropriate source(s): None     Escalation of care considered, and ultimately not performed: acute inpatient care management, however at this time, the patient is most appropriate for outpatient management.  Patient has no chest pain.  She has no shortness of breath.  Her O2 sats are 97% on room air.  Lungs are clear.  Chest x-ray shows some possible mild increased interstitial edema.  Her left leg is normal for her.  The right leg is swollen.  Low suspicion for CHF despite a BNP of 1200.  We do not have any previous BMPs on this patient and so is unknown clear what her baseline actually is.  Since patient is not hypoxic, is not symptomatic,  only has unilateral leg swelling, has normal oxygen sats, etc, I think the patient can follow-up as an outpatient with her primary care physician, who agrees to see her this coming Monday.  At this time I do not think she needs to be admitted.    Barriers to care at this time, including but not  limited to:  None known .     Decision tools and prescription drugs considered including, but not limited to: Pain Medications patient has not reported any pain. .    FINAL DIAGNOSIS  1. Edema of right lower extremity Acute        ITrish (Scribe), am scribing for, and in the presence of, Kaylie Montilla M.D..    Electronically signed by: Trish Hanna (Scribe), 9/26/2024    IKaylie M.D. personally performed the services described in this documentation, as scribed by Trish Hanna in my presence, and it is both accurate and complete.     This dictation has been created using voice recognition software. The accuracy of the dictation is limited by the abilities of the software. I expect there may be some errors of grammar and possibly content. I made every attempt to manually correct the errors within my dictation. However, errors related to voice recognition software may still exist and should be interpreted within the appropriate context.      The note accurately reflects work and decisions made by me.  Kaylie Montilla M.D.  9/26/2024  1:55 PM

## 2024-09-27 PROBLEM — I27.20 PULMONARY HTN (HCC): Status: ACTIVE | Noted: 2024-09-27

## 2024-09-27 PROBLEM — R22.43 LOCALIZED SWELLING OF BOTH LOWER LEGS: Status: ACTIVE | Noted: 2024-09-27

## 2024-10-08 ENCOUNTER — APPOINTMENT (OUTPATIENT)
Dept: RADIOLOGY | Facility: MEDICAL CENTER | Age: 79
End: 2024-10-08
Attending: STUDENT IN AN ORGANIZED HEALTH CARE EDUCATION/TRAINING PROGRAM
Payer: MEDICARE

## 2024-10-08 ENCOUNTER — HOSPITAL ENCOUNTER (EMERGENCY)
Facility: MEDICAL CENTER | Age: 79
End: 2024-10-08
Attending: STUDENT IN AN ORGANIZED HEALTH CARE EDUCATION/TRAINING PROGRAM
Payer: MEDICARE

## 2024-10-08 VITALS
DIASTOLIC BLOOD PRESSURE: 83 MMHG | WEIGHT: 135 LBS | HEART RATE: 62 BPM | TEMPERATURE: 98.1 F | SYSTOLIC BLOOD PRESSURE: 143 MMHG | RESPIRATION RATE: 16 BRPM | OXYGEN SATURATION: 96 % | BODY MASS INDEX: 25.51 KG/M2
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
## 2024-10-08 DIAGNOSIS — S09.90XA CLOSED HEAD INJURY, INITIAL ENCOUNTER: ICD-10-CM

## 2024-10-08 LAB
ALBUMIN SERPL BCP-MCNC: 3.2 G/DL (ref 3.2–4.9)
ALBUMIN/GLOB SERPL: 1.8 G/DL
ALP SERPL-CCNC: 70 U/L (ref 30–99)
ALT SERPL-CCNC: 21 U/L (ref 2–50)
ANION GAP SERPL CALC-SCNC: 8 MMOL/L (ref 7–16)
APPEARANCE UR: CLEAR
AST SERPL-CCNC: 25 U/L (ref 12–45)
BASOPHILS # BLD AUTO: 0.5 % (ref 0–1.8)
BASOPHILS # BLD: 0.03 K/UL (ref 0–0.12)
BILIRUB SERPL-MCNC: 0.2 MG/DL (ref 0.1–1.5)
BILIRUB UR QL STRIP.AUTO: NEGATIVE
BUN SERPL-MCNC: 28 MG/DL (ref 8–22)
CALCIUM ALBUM COR SERPL-MCNC: 9.1 MG/DL (ref 8.5–10.5)
CALCIUM SERPL-MCNC: 8.5 MG/DL (ref 8.5–10.5)
CHLORIDE SERPL-SCNC: 109 MMOL/L (ref 96–112)
CO2 SERPL-SCNC: 24 MMOL/L (ref 20–33)
COLOR UR: YELLOW
CREAT SERPL-MCNC: 0.82 MG/DL (ref 0.5–1.4)
EOSINOPHIL # BLD AUTO: 0.12 K/UL (ref 0–0.51)
EOSINOPHIL NFR BLD: 1.8 % (ref 0–6.9)
ERYTHROCYTE [DISTWIDTH] IN BLOOD BY AUTOMATED COUNT: 47.8 FL (ref 35.9–50)
FLUAV RNA SPEC QL NAA+PROBE: NEGATIVE
FLUBV RNA SPEC QL NAA+PROBE: NEGATIVE
GFR SERPLBLD CREATININE-BSD FMLA CKD-EPI: 72 ML/MIN/1.73 M 2
GLOBULIN SER CALC-MCNC: 1.8 G/DL (ref 1.9–3.5)
GLUCOSE SERPL-MCNC: 117 MG/DL (ref 65–99)
GLUCOSE UR STRIP.AUTO-MCNC: NEGATIVE MG/DL
HCT VFR BLD AUTO: 33.5 % (ref 37–47)
HGB BLD-MCNC: 11.1 G/DL (ref 12–16)
IMM GRANULOCYTES # BLD AUTO: 0.02 K/UL (ref 0–0.11)
IMM GRANULOCYTES NFR BLD AUTO: 0.3 % (ref 0–0.9)
KETONES UR STRIP.AUTO-MCNC: NEGATIVE MG/DL
LEUKOCYTE ESTERASE UR QL STRIP.AUTO: NEGATIVE
LYMPHOCYTES # BLD AUTO: 0.77 K/UL (ref 1–4.8)
LYMPHOCYTES NFR BLD: 11.6 % (ref 22–41)
MCH RBC QN AUTO: 31.1 PG (ref 27–33)
MCHC RBC AUTO-ENTMCNC: 33.1 G/DL (ref 32.2–35.5)
MCV RBC AUTO: 93.8 FL (ref 81.4–97.8)
MICRO URNS: NORMAL
MONOCYTES # BLD AUTO: 0.5 K/UL (ref 0–0.85)
MONOCYTES NFR BLD AUTO: 7.6 % (ref 0–13.4)
NEUTROPHILS # BLD AUTO: 5.18 K/UL (ref 1.82–7.42)
NEUTROPHILS NFR BLD: 78.2 % (ref 44–72)
NITRITE UR QL STRIP.AUTO: NEGATIVE
NRBC # BLD AUTO: 0 K/UL
NRBC BLD-RTO: 0 /100 WBC (ref 0–0.2)
PH UR STRIP.AUTO: 5 [PH] (ref 5–8)
PLATELET # BLD AUTO: 199 K/UL (ref 164–446)
PMV BLD AUTO: 9.7 FL (ref 9–12.9)
POTASSIUM SERPL-SCNC: 4 MMOL/L (ref 3.6–5.5)
PROT SERPL-MCNC: 5 G/DL (ref 6–8.2)
PROT UR QL STRIP: NEGATIVE MG/DL
RBC # BLD AUTO: 3.57 M/UL (ref 4.2–5.4)
RBC UR QL AUTO: NEGATIVE
RSV RNA SPEC QL NAA+PROBE: NEGATIVE
SARS-COV-2 RNA RESP QL NAA+PROBE: NOTDETECTED
SODIUM SERPL-SCNC: 141 MMOL/L (ref 135–145)
SP GR UR STRIP.AUTO: 1.03
TROPONIN T SERPL-MCNC: 17 NG/L (ref 6–19)
UROBILINOGEN UR STRIP.AUTO-MCNC: 0.2 MG/DL
WBC # BLD AUTO: 6.6 K/UL (ref 4.8–10.8)

## 2024-10-08 PROCEDURE — 93005 ELECTROCARDIOGRAM TRACING: CPT | Performed by: STUDENT IN AN ORGANIZED HEALTH CARE EDUCATION/TRAINING PROGRAM

## 2024-10-08 PROCEDURE — 81003 URINALYSIS AUTO W/O SCOPE: CPT

## 2024-10-08 PROCEDURE — 36415 COLL VENOUS BLD VENIPUNCTURE: CPT

## 2024-10-08 PROCEDURE — 72125 CT NECK SPINE W/O DYE: CPT

## 2024-10-08 PROCEDURE — 70450 CT HEAD/BRAIN W/O DYE: CPT

## 2024-10-08 PROCEDURE — 71045 X-RAY EXAM CHEST 1 VIEW: CPT

## 2024-10-08 PROCEDURE — 80053 COMPREHEN METABOLIC PANEL: CPT

## 2024-10-08 PROCEDURE — 99285 EMERGENCY DEPT VISIT HI MDM: CPT

## 2024-10-08 PROCEDURE — 84484 ASSAY OF TROPONIN QUANT: CPT

## 2024-10-08 PROCEDURE — 85025 COMPLETE CBC W/AUTO DIFF WBC: CPT

## 2024-10-08 PROCEDURE — 0241U HCHG SARS-COV-2 COVID-19 NFCT DS RESP RNA 4 TRGT ED POC: CPT

## 2024-10-08 ASSESSMENT — FIBROSIS 4 INDEX: FIB4 SCORE: 2.77

## 2024-10-09 LAB — EKG IMPRESSION: NORMAL

## 2024-10-10 PROBLEM — R53.81 PHYSICAL DEBILITY: Status: ACTIVE | Noted: 2024-10-10

## 2024-10-10 PROBLEM — Z91.81 PERSONAL HISTORY OF FALL: Status: ACTIVE | Noted: 2024-10-10

## 2024-10-15 PROBLEM — M79.89 SWELLING OF RIGHT HAND: Status: ACTIVE | Noted: 2024-10-15

## 2024-12-06 ENCOUNTER — APPOINTMENT (OUTPATIENT)
Dept: RADIOLOGY | Facility: MEDICAL CENTER | Age: 79
End: 2024-12-06
Attending: EMERGENCY MEDICINE
Payer: MEDICARE

## 2024-12-06 ENCOUNTER — HOSPITAL ENCOUNTER (EMERGENCY)
Facility: MEDICAL CENTER | Age: 79
End: 2024-12-07
Attending: EMERGENCY MEDICINE
Payer: MEDICARE
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
## 2024-12-06 DIAGNOSIS — E86.0 DEHYDRATION: ICD-10-CM

## 2024-12-06 LAB
ALBUMIN SERPL BCP-MCNC: 3.4 G/DL (ref 3.2–4.9)
ALBUMIN/GLOB SERPL: 1.5 G/DL
ALP SERPL-CCNC: 71 U/L (ref 30–99)
ALT SERPL-CCNC: 16 U/L (ref 2–50)
ANION GAP SERPL CALC-SCNC: 8 MMOL/L (ref 7–16)
APPEARANCE UR: ABNORMAL
APTT PPP: 34.3 SEC (ref 24.7–36)
AST SERPL-CCNC: 22 U/L (ref 12–45)
BACTERIA #/AREA URNS HPF: ABNORMAL /HPF
BASOPHILS # BLD AUTO: 0.4 % (ref 0–1.8)
BASOPHILS # BLD: 0.02 K/UL (ref 0–0.12)
BILIRUB SERPL-MCNC: 0.2 MG/DL (ref 0.1–1.5)
BILIRUB UR QL STRIP.AUTO: NEGATIVE
BUN SERPL-MCNC: 34 MG/DL (ref 8–22)
CA OXALATE CRYSTAL  1765: PRESENT /HPF
CALCIUM ALBUM COR SERPL-MCNC: 9.8 MG/DL (ref 8.5–10.5)
CALCIUM SERPL-MCNC: 9.3 MG/DL (ref 8.5–10.5)
CASTS URNS QL MICRO: ABNORMAL /LPF (ref 0–2)
CHLORIDE SERPL-SCNC: 107 MMOL/L (ref 96–112)
CO2 SERPL-SCNC: 25 MMOL/L (ref 20–33)
COLOR UR: YELLOW
CREAT SERPL-MCNC: 0.75 MG/DL (ref 0.5–1.4)
EKG IMPRESSION: NORMAL
EOSINOPHIL # BLD AUTO: 0.09 K/UL (ref 0–0.51)
EOSINOPHIL NFR BLD: 1.9 % (ref 0–6.9)
EPITHELIAL CELLS 1715: ABNORMAL /HPF (ref 0–5)
ERYTHROCYTE [DISTWIDTH] IN BLOOD BY AUTOMATED COUNT: 48.3 FL (ref 35.9–50)
GFR SERPLBLD CREATININE-BSD FMLA CKD-EPI: 80 ML/MIN/1.73 M 2
GLOBULIN SER CALC-MCNC: 2.3 G/DL (ref 1.9–3.5)
GLUCOSE SERPL-MCNC: 114 MG/DL (ref 65–99)
GLUCOSE UR STRIP.AUTO-MCNC: NEGATIVE MG/DL
HCT VFR BLD AUTO: 34.5 % (ref 37–47)
HGB BLD-MCNC: 11.4 G/DL (ref 12–16)
IMM GRANULOCYTES # BLD AUTO: 0.01 K/UL (ref 0–0.11)
IMM GRANULOCYTES NFR BLD AUTO: 0.2 % (ref 0–0.9)
INR PPP: 1.17 (ref 0.87–1.13)
KETONES UR STRIP.AUTO-MCNC: NEGATIVE MG/DL
LEUKOCYTE ESTERASE UR QL STRIP.AUTO: NEGATIVE
LYMPHOCYTES # BLD AUTO: 1.06 K/UL (ref 1–4.8)
LYMPHOCYTES NFR BLD: 22.6 % (ref 22–41)
MCH RBC QN AUTO: 31.4 PG (ref 27–33)
MCHC RBC AUTO-ENTMCNC: 33 G/DL (ref 32.2–35.5)
MCV RBC AUTO: 95 FL (ref 81.4–97.8)
MICRO URNS: ABNORMAL
MONOCYTES # BLD AUTO: 0.57 K/UL (ref 0–0.85)
MONOCYTES NFR BLD AUTO: 12.2 % (ref 0–13.4)
NEUTROPHILS # BLD AUTO: 2.93 K/UL (ref 1.82–7.42)
NEUTROPHILS NFR BLD: 62.7 % (ref 44–72)
NITRITE UR QL STRIP.AUTO: NEGATIVE
NRBC # BLD AUTO: 0 K/UL
NRBC BLD-RTO: 0 /100 WBC (ref 0–0.2)
PH UR STRIP.AUTO: 5.5 [PH] (ref 5–8)
PLATELET # BLD AUTO: 254 K/UL (ref 164–446)
PMV BLD AUTO: 9.6 FL (ref 9–12.9)
POTASSIUM SERPL-SCNC: 3.9 MMOL/L (ref 3.6–5.5)
PROT SERPL-MCNC: 5.7 G/DL (ref 6–8.2)
PROT UR QL STRIP: NEGATIVE MG/DL
PROTHROMBIN TIME: 14.9 SEC (ref 12–14.6)
RBC # BLD AUTO: 3.63 M/UL (ref 4.2–5.4)
RBC # URNS HPF: ABNORMAL /HPF (ref 0–2)
RBC UR QL AUTO: NEGATIVE
SODIUM SERPL-SCNC: 140 MMOL/L (ref 135–145)
SP GR UR STRIP.AUTO: 1.04
TROPONIN T SERPL-MCNC: 17 NG/L (ref 6–19)
UROBILINOGEN UR STRIP.AUTO-MCNC: 1 EU/DL
WBC # BLD AUTO: 4.7 K/UL (ref 4.8–10.8)
WBC #/AREA URNS HPF: ABNORMAL /HPF

## 2024-12-06 PROCEDURE — 85025 COMPLETE CBC W/AUTO DIFF WBC: CPT

## 2024-12-06 PROCEDURE — 700105 HCHG RX REV CODE 258: Performed by: EMERGENCY MEDICINE

## 2024-12-06 PROCEDURE — 72125 CT NECK SPINE W/O DYE: CPT

## 2024-12-06 PROCEDURE — 80053 COMPREHEN METABOLIC PANEL: CPT

## 2024-12-06 PROCEDURE — 85730 THROMBOPLASTIN TIME PARTIAL: CPT

## 2024-12-06 PROCEDURE — 99285 EMERGENCY DEPT VISIT HI MDM: CPT

## 2024-12-06 PROCEDURE — 93005 ELECTROCARDIOGRAM TRACING: CPT | Mod: TC | Performed by: EMERGENCY MEDICINE

## 2024-12-06 PROCEDURE — 70450 CT HEAD/BRAIN W/O DYE: CPT

## 2024-12-06 PROCEDURE — 84484 ASSAY OF TROPONIN QUANT: CPT

## 2024-12-06 PROCEDURE — 36415 COLL VENOUS BLD VENIPUNCTURE: CPT

## 2024-12-06 PROCEDURE — 81001 URINALYSIS AUTO W/SCOPE: CPT

## 2024-12-06 PROCEDURE — 85610 PROTHROMBIN TIME: CPT

## 2024-12-06 RX ORDER — SODIUM CHLORIDE 9 MG/ML
1000 INJECTION, SOLUTION INTRAVENOUS ONCE
Status: COMPLETED | OUTPATIENT
Start: 2024-12-06 | End: 2024-12-07

## 2024-12-06 RX ADMIN — SODIUM CHLORIDE 1000 ML: 9 INJECTION, SOLUTION INTRAVENOUS at 21:36

## 2024-12-06 ASSESSMENT — FIBROSIS 4 INDEX: FIB4 SCORE: 2.22

## 2024-12-07 VITALS
HEART RATE: 62 BPM | DIASTOLIC BLOOD PRESSURE: 66 MMHG | RESPIRATION RATE: 20 BRPM | HEIGHT: 61 IN | OXYGEN SATURATION: 96 % | WEIGHT: 135 LBS | SYSTOLIC BLOOD PRESSURE: 142 MMHG | TEMPERATURE: 97.4 F | BODY MASS INDEX: 25.49 KG/M2

## 2024-12-07 NOTE — ED PROVIDER NOTES
"ED Provider Note    CHIEF COMPLAINT  Chief Complaint   Patient presents with    T-5000 FALL     PT found on floor of group home, small laceration noted to face. PT Aox1 at baseline. No blood thinners. FSBS 149       EXTERNAL RECORDS REVIEWED  Outpatient Notes patient has a history of idiopathic hypotension and Alzheimer's disease was just seen in primary care office 2 days ago    HPI/ROS  LIMITATION TO HISTORY   Select: Altered mental status / Confusion  OUTSIDE HISTORIAN(S):  EMS patient was found on the floor of her group home with small laceration to her face and brought in for evaluation blood sugar was 149 was hypotensive with standing    Jenniferraisa Vuong is a 79 y.o. female who presents to the emergency department with a fall.  The patient has no complaints at this time but she is pleasantly confused.  She denies any pain at this time    PAST MEDICAL HISTORY   has a past medical history of Alzheimer disease (HCC), Anxiety, Arthritis, Cancer (HCC), Cataract, Heart murmur, Hyperlipidemia (8/5/2023), Hypertension, and Urinary tract infection.    SURGICAL HISTORY   has a past surgical history that includes arthroplasty and abdominal hysterectomy total.    FAMILY HISTORY  No family history on file.    SOCIAL HISTORY  Social History     Tobacco Use    Smoking status: Never    Smokeless tobacco: Never   Vaping Use    Vaping status: Never Used   Substance and Sexual Activity    Alcohol use: Not Currently     Alcohol/week: 0.6 oz     Types: 1 Glasses of wine per week     Comment: once a week    Drug use: Never    Sexual activity: Not Currently       CURRENT MEDICATIONS  Home Medications    **Home medications have not yet been reviewed for this encounter**         ALLERGIES  No Known Allergies    PHYSICAL EXAM  VITAL SIGNS: BP (!) 142/66   Pulse 62   Temp 36.3 °C (97.4 °F) (Temporal)   Resp 20   Ht 1.549 m (5' 1\")   Wt 61.2 kg (135 lb)   LMP 04/02/1994 (Approximate)   SpO2 96%   BMI 25.51 kg/m²    Pulse OX: " Pulse Oxygen level is within normal limits  Constitutional: Alert in no apparent distress.  HENT: Normocephalic, small abrasion or skin tear to the right cheek no active bleeding mild tenderness palpation on the cervical spine without step-offs or swelling, MMM  Eyes: PERound. Conjunctiva normal, non-icteric.   Heart: Regular rate and rhythm, intact distal pulses   Lungs: Symmetrical movement, no resp distress   Abdomen: Non-tender, non-distended, normal bowel sounds  EXT/Back no signs of trauma  Skin: Warm, Dry, No erythema, No rash.   Neurologic: Alert and oriented to person, Grossly non-focal.       EKG/LABS  Labs Reviewed   CBC WITH DIFFERENTIAL - Abnormal; Notable for the following components:       Result Value    WBC 4.7 (*)     RBC 3.63 (*)     Hemoglobin 11.4 (*)     Hematocrit 34.5 (*)     All other components within normal limits   COMP METABOLIC PANEL - Abnormal; Notable for the following components:    Glucose 114 (*)     Bun 34 (*)     Total Protein 5.7 (*)     All other components within normal limits   PROTHROMBIN TIME - Abnormal; Notable for the following components:    PT 14.9 (*)     INR 1.17 (*)     All other components within normal limits   URINALYSIS,CULTURE IF INDICATED - Abnormal; Notable for the following components:    Character Cloudy (*)     All other components within normal limits   URINE MICROSCOPIC (W/UA) - Abnormal; Notable for the following components:    Ca Oxalate Crystal Present (*)     All other components within normal limits   TROPONIN   APTT   ESTIMATED GFR     Results for orders placed or performed during the hospital encounter of 24   EKG (NOW)   Result Value Ref Range    Report       [facility] Emergency Dept.    Test Date:  2024  Pt Name:    MARTINA CALDERA                   Department: ER  MRN:        [id number]                      Room:       RD 10  Gender:     Female                       Technician:  :        [id number]                    Requested By:LORIN PAIZ  Order #:    [id number]                    Reading MD: Lorin Paiz MD    Measurements  Intervals                                Axis  Rate:       60                           P:          41  NV:         184                          QRS:        -26  QRSD:       97                           T:          -9  QT:         463  QTc:        463    Interpretive Statements  Sinus rhythm at a rate of 60 no elevations or ST depressions no abnormal T  wave inversions borderline left axis deviation with LVH unchanged from prior  Compared to ECG 10/08/2024 17:08:20  LEFT AXIS DEVIATIO, LVH  Electronically Signed On 12- 23:07:51 PST by Lorin Paiz MD         I have independently interpreted this EKG    RADIOLOGY/PROCEDURES   I have independently interpreted the diagnostic imaging associated with this visit and am waiting the final reading from the radiologist.   My preliminary interpretation is as follows:     CT head without fracture or bleed  CT cervical spine without vertebral body fracture or subluxation    Radiologist interpretation:  CT-CSPINE WITHOUT PLUS RECONS   Final Result         1. No acute fracture from C1 through T1 is visualized.      2. Mild anterolisthesis of C5-6 and C7-T1.         CT-HEAD W/O   Final Result         1. No acute intracranial abnormality. No evidence of acute intracranial hemorrhage or mass lesion.                               COURSE & MEDICAL DECISION MAKING    ASSESSMENT, COURSE AND PLAN  Care Narrative:     Patient is an 79-year-old female who presents emergency department with possible syncopal episode versus a fall secondary to her idiopathic hypotension in the setting of Alzheimer's disease.  Minimal abrasion of the right cheek, due to age and the fall and evidence of head injury she was made a code TBI.  She was sent to the CT scanner for C-spine and head in light of cannot clear the patient by Nexus criteria.  Will evaluate for other causes that  she may have passed out or for hypotension beyond her idiopathic hypotension including infectious process electrolyte abnormality or arrhythmia.  The patient's daughter would like everything done and if we can find anything to treat it at this time.    DISPOSITION AND DISCUSSIONS  Patient is resting comfortably she was able to ambulate with little bit into the bathroom with nursing staff had a positive response to IV fluids there is no signs of significant symptomatic anemia arrhythmia electrolyte abnormality or infection.  She will be discharged back to her facility the patient's daughter was made aware via nursing staff    Hydration: Based on the patient's presentation of Hypotension the patient was given IV fluids. IV Hydration was used because oral hydration was not as rapid as required. Upon recheck following hydration, the patient was improved.         I have discussed management of the patient with the following physicians and GRIS's:  none    Discussion of management with other QHP or appropriate source(s): None     Escalation of care considered, and ultimately not performed:acute inpatient care management, however at this time, the patient is most appropriate for outpatient management    Barriers to care at this time, including but not limited to:  na .     Decision tools and prescription drugs considered including, but not limited to: NEXUS criteriacould no clear .    The patient will return for new or worsening symptoms and is stable at the time of discharge.    The patient is referred to a primary physician for blood pressure management, diabetic screening, and for all other preventative health concerns.    DISPOSITION:  Patient will be discharged home in stable condition.    FOLLOW UP:  Robe Alexander A.P.R.NArben  [address] [facility] [address]  [phone]    Schedule an appointment as soon as possible for a visit         OUTPATIENT MEDICATIONS:  Discharge Medication List as of 12/7/2024 12:28 AM             FINAL DIAGNOSIS  1. Fall, initial encounter    2. Dehydration         Electronically signed by: Lorin Reynolds M.D., 12/6/2024 8:38 PM

## 2024-12-07 NOTE — ED NOTES
Report to Brianna PRICE and Ema PRICE. Pt on cardiac, HR, BP, and oxygen monitor. Pt GCS 14 off for confusion a/o x 1. Pt on bed alarm, pt bed locked in lowest position side rails up x 2. Pt in hallway bed.

## 2024-12-07 NOTE — ED TRIAGE NOTES
"Chief Complaint   Patient presents with    T-5000 FALL     PT found on floor of group home, small laceration noted to face. PT Aox1 at baseline. No blood thinners. FSBS 149       PT activated as TBI.     /88   Pulse 70   Temp 36.3 °C (97.4 °F) (Temporal)   Resp 18   Ht 1.549 m (5' 1\")   Wt 61.2 kg (135 lb)   LMP 04/02/1994 (Approximate)   SpO2 96%   BMI 25.51 kg/m²     "

## 2024-12-07 NOTE — DISCHARGE PLANNING
Medical Social Work    MSW received a voalte message from bedside RN that pt is ready to return to her group home: [facility] at [address].  RN has already contacted group home owner who will accept pt back.  MSW faxed PCS and facesheet to [facility] and made follow up phone call to [facility] to arrange transport for 0045.  Bedside RN made aware of transport time.

## 2024-12-07 NOTE — ED NOTES
Pt DC'd to  via [facility]. Pt unable to sign paperwork. Report/updates given to [facility] &  owner Elizabeth.

## 2024-12-07 NOTE — ED NOTES
Pt ambulatory to bathroom with moderate assistance to collect urine sample, collected and sent to lab. Pt moved into Red 10 and placed on all monitoring devices. Pt's IV positional so fluids were flowing very slowly. IV repositioned and is now flowing more freely. Pt currently sitting up in bed with even and unlabored breaths. Pt is very confused and has rambling speech, but is in no apparent distress at this time.

## 2024-12-07 NOTE — ED NOTES
Called pt's daughter Lili ([phone]) to update her regarding pt's status and pending discharge. Per daughter pt's group home is called [facility] and is at [address] [facility] in [facility]. Owner/Facilitator's name is Elizabeth and her phone number is [phone]. Will contact her to discuss transporting pt back home once her IV fluids are finished.

## 2024-12-12 PROBLEM — S51.819S: Status: ACTIVE | Noted: 2024-12-12

## 2024-12-12 PROBLEM — F02.811 ALZHEIMER'S DEMENTIA WITH AGITATION (HCC): Status: ACTIVE | Noted: 2022-10-29

## 2024-12-21 ENCOUNTER — APPOINTMENT (OUTPATIENT)
Dept: RADIOLOGY | Facility: MEDICAL CENTER | Age: 79
End: 2024-12-21
Attending: EMERGENCY MEDICINE
Payer: MEDICARE

## 2024-12-21 ENCOUNTER — HOSPITAL ENCOUNTER (EMERGENCY)
Facility: MEDICAL CENTER | Age: 79
End: 2024-12-21
Attending: EMERGENCY MEDICINE
Payer: MEDICARE

## 2024-12-21 ENCOUNTER — PHARMACY VISIT (OUTPATIENT)
Dept: PHARMACY | Facility: MEDICAL CENTER | Age: 79
End: 2024-12-21
Payer: COMMERCIAL

## 2024-12-21 VITALS
HEIGHT: 61 IN | SYSTOLIC BLOOD PRESSURE: 120 MMHG | HEART RATE: 60 BPM | DIASTOLIC BLOOD PRESSURE: 57 MMHG | RESPIRATION RATE: 16 BRPM | BODY MASS INDEX: 25.49 KG/M2 | WEIGHT: 135 LBS | TEMPERATURE: 97.9 F | OXYGEN SATURATION: 100 %

## 2024-12-21 DIAGNOSIS — N39.0 ACUTE UTI: ICD-10-CM
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
## 2024-12-21 DIAGNOSIS — S01.01XA LACERATION OF SCALP, INITIAL ENCOUNTER: ICD-10-CM

## 2024-12-21 LAB
ALBUMIN SERPL BCP-MCNC: 3.5 G/DL (ref 3.2–4.9)
ALBUMIN/GLOB SERPL: 1.4 G/DL
ALP SERPL-CCNC: 70 U/L (ref 30–99)
ALT SERPL-CCNC: 15 U/L (ref 2–50)
ANION GAP SERPL CALC-SCNC: 9 MMOL/L (ref 7–16)
APPEARANCE UR: ABNORMAL
AST SERPL-CCNC: 22 U/L (ref 12–45)
BACTERIA #/AREA URNS HPF: ABNORMAL /HPF
BASOPHILS # BLD AUTO: 0.2 % (ref 0–1.8)
BASOPHILS # BLD: 0.02 K/UL (ref 0–0.12)
BILIRUB SERPL-MCNC: 0.3 MG/DL (ref 0.1–1.5)
BILIRUB UR QL STRIP.AUTO: NEGATIVE
BUN SERPL-MCNC: 17 MG/DL (ref 8–22)
CALCIUM ALBUM COR SERPL-MCNC: 9.8 MG/DL (ref 8.5–10.5)
CALCIUM SERPL-MCNC: 9.4 MG/DL (ref 8.5–10.5)
CASTS URNS QL MICRO: ABNORMAL /LPF (ref 0–2)
CHLORIDE SERPL-SCNC: 106 MMOL/L (ref 96–112)
CO2 SERPL-SCNC: 25 MMOL/L (ref 20–33)
COLOR UR: YELLOW
CREAT SERPL-MCNC: 0.69 MG/DL (ref 0.5–1.4)
EOSINOPHIL # BLD AUTO: 0.02 K/UL (ref 0–0.51)
EOSINOPHIL NFR BLD: 0.2 % (ref 0–6.9)
EPITHELIAL CELLS 1715: ABNORMAL /HPF (ref 0–5)
ERYTHROCYTE [DISTWIDTH] IN BLOOD BY AUTOMATED COUNT: 50.7 FL (ref 35.9–50)
GFR SERPLBLD CREATININE-BSD FMLA CKD-EPI: 87 ML/MIN/1.73 M 2
GLOBULIN SER CALC-MCNC: 2.5 G/DL (ref 1.9–3.5)
GLUCOSE SERPL-MCNC: 95 MG/DL (ref 65–99)
GLUCOSE UR STRIP.AUTO-MCNC: NEGATIVE MG/DL
HCT VFR BLD AUTO: 35.8 % (ref 37–47)
HGB BLD-MCNC: 11.8 G/DL (ref 12–16)
IMM GRANULOCYTES # BLD AUTO: 0.03 K/UL (ref 0–0.11)
IMM GRANULOCYTES NFR BLD AUTO: 0.3 % (ref 0–0.9)
KETONES UR STRIP.AUTO-MCNC: ABNORMAL MG/DL
LEUKOCYTE ESTERASE UR QL STRIP.AUTO: NEGATIVE
LYMPHOCYTES # BLD AUTO: 0.49 K/UL (ref 1–4.8)
LYMPHOCYTES NFR BLD: 5.6 % (ref 22–41)
MCH RBC QN AUTO: 31.6 PG (ref 27–33)
MCHC RBC AUTO-ENTMCNC: 33 G/DL (ref 32.2–35.5)
MCV RBC AUTO: 95.7 FL (ref 81.4–97.8)
MICRO URNS: ABNORMAL
MONOCYTES # BLD AUTO: 0.72 K/UL (ref 0–0.85)
MONOCYTES NFR BLD AUTO: 8.2 % (ref 0–13.4)
NEUTROPHILS # BLD AUTO: 7.54 K/UL (ref 1.82–7.42)
NEUTROPHILS NFR BLD: 85.5 % (ref 44–72)
NITRITE UR QL STRIP.AUTO: POSITIVE
NRBC # BLD AUTO: 0 K/UL
NRBC BLD-RTO: 0 /100 WBC (ref 0–0.2)
PH UR STRIP.AUTO: 7 [PH] (ref 5–8)
PLATELET # BLD AUTO: 216 K/UL (ref 164–446)
PMV BLD AUTO: 9.6 FL (ref 9–12.9)
POTASSIUM SERPL-SCNC: 4.1 MMOL/L (ref 3.6–5.5)
PROT SERPL-MCNC: 6 G/DL (ref 6–8.2)
PROT UR QL STRIP: NEGATIVE MG/DL
RBC # BLD AUTO: 3.74 M/UL (ref 4.2–5.4)
RBC # URNS HPF: ABNORMAL /HPF (ref 0–2)
RBC UR QL AUTO: NEGATIVE
SODIUM SERPL-SCNC: 140 MMOL/L (ref 135–145)
SP GR UR STRIP.AUTO: 1.02
UROBILINOGEN UR STRIP.AUTO-MCNC: 1 EU/DL
WBC # BLD AUTO: 8.8 K/UL (ref 4.8–10.8)
WBC #/AREA URNS HPF: ABNORMAL /HPF

## 2024-12-21 PROCEDURE — 700101 HCHG RX REV CODE 250: Performed by: EMERGENCY MEDICINE

## 2024-12-21 PROCEDURE — 99284 EMERGENCY DEPT VISIT MOD MDM: CPT

## 2024-12-21 PROCEDURE — 305308 HCHG STAPLER,SKIN,DISP.

## 2024-12-21 PROCEDURE — 81001 URINALYSIS AUTO W/SCOPE: CPT

## 2024-12-21 PROCEDURE — 73030 X-RAY EXAM OF SHOULDER: CPT | Mod: LT

## 2024-12-21 PROCEDURE — 36415 COLL VENOUS BLD VENIPUNCTURE: CPT

## 2024-12-21 PROCEDURE — 85025 COMPLETE CBC W/AUTO DIFF WBC: CPT

## 2024-12-21 PROCEDURE — 72125 CT NECK SPINE W/O DYE: CPT

## 2024-12-21 PROCEDURE — 304217 HCHG IRRIGATION SYSTEM

## 2024-12-21 PROCEDURE — 73080 X-RAY EXAM OF ELBOW: CPT | Mod: LT

## 2024-12-21 PROCEDURE — RXMED WILLOW AMBULATORY MEDICATION CHARGE: Performed by: EMERGENCY MEDICINE

## 2024-12-21 PROCEDURE — 304999 HCHG REPAIR-SIMPLE/INTERMED LEVEL 1

## 2024-12-21 PROCEDURE — 70450 CT HEAD/BRAIN W/O DYE: CPT

## 2024-12-21 PROCEDURE — 80053 COMPREHEN METABOLIC PANEL: CPT

## 2024-12-21 PROCEDURE — 73501 X-RAY EXAM HIP UNI 1 VIEW: CPT | Mod: RT

## 2024-12-21 RX ORDER — CEFDINIR 300 MG/1
300 CAPSULE ORAL 2 TIMES DAILY
Qty: 10 CAPSULE | Refills: 0 | Status: ACTIVE | OUTPATIENT
Start: 2024-12-21 | End: 2024-12-26

## 2024-12-21 RX ORDER — LIDOCAINE HYDROCHLORIDE AND EPINEPHRINE 10; 10 MG/ML; UG/ML
20 INJECTION, SOLUTION INFILTRATION; PERINEURAL ONCE
Status: COMPLETED | OUTPATIENT
Start: 2024-12-21 | End: 2024-12-21

## 2024-12-21 RX ADMIN — LIDOCAINE HYDROCHLORIDE AND EPINEPHRINE 20 ML: 10; 10 INJECTION, SOLUTION INFILTRATION; PERINEURAL at 13:19

## 2024-12-21 ASSESSMENT — PAIN DESCRIPTION - PAIN TYPE: TYPE: ACUTE PAIN

## 2024-12-21 ASSESSMENT — FIBROSIS 4 INDEX: FIB4 SCORE: 1.75

## 2024-12-21 NOTE — ED NOTES
DC to group home with written and verbal instructions regarding f/u, activity and RX.  Verbalized understanding, assisted via wheelchair with family for transport.

## 2024-12-21 NOTE — ED TRIAGE NOTES
"Chief Complaint   Patient presents with    GLF     BIBA from group home.  Pt was trying to sit in a chair when she missed and fell backwards.  +headstrike. -LOC, -thinners.  Per EMS, 1cm laceration noted to posterior head. GCS 14 which is baseline.      /56   Pulse (!) 59   Resp 14   Ht 1.549 m (5' 1\")   Wt 61.2 kg (135 lb)   LMP 04/02/1994 (Approximate)   SpO2 97%   BMI 25.51 kg/m²     Pt evaluated by ERP at charge desk and taken to CT. Per EMS, pt has history of dementia and anxiety. EMS also states pt on midodrine for hypotension.  Address for [facility] is [address] [place] [address] [place] [address]. Contact information for [facility] is [phone]. Pt brought back to ED room and report given to ALBERT Rios.     "

## 2024-12-21 NOTE — ED PROVIDER NOTES
ER Provider Note    Scribed for Jordan Rome M.d. by Lory Terrell. 12/21/2024  12:19 PM    Primary Care Provider: QUENTIN Qureshi    CHIEF COMPLAINT   Chief Complaint   Patient presents with    GLF     BIBA from group home.  Pt was trying to sit in a chair when she missed and fell backwards.  +headstrike. -LOC, -thinners.  Per EMS, 1cm laceration noted to posterior head. GCS 14 which is baseline.      EXTERNAL RECORDS REVIEWED  Outpatient Notes The patient was seen here yesterday by primary car as a full code. She was also seen here on the 6th, where she was found down and had low blood pressure. She had labs done that day.     HPI/ROS  LIMITATION TO HISTORY   Select: : None  OUTSIDE HISTORIAN(S):  EMS provide the patient's history.    Jennifer Vuong is a 79 y.o. female with a history of dementia who [facility] complaining of an acute TBI after a ground level fall. EMS notes that the patient normally uses a walker when walking around and today had gone to sit in a chair, missed, fell backwards and hit the back of her head. Denies any loss of consciousness. Denies being on any blood thinners. En route, the patient was noted to have a low heart rate, being about 58 bpm. At charge desk, the patient reports having a headache, neck pain and arm pain. There were no alleviating factors reported. Denies any allergies.     PAST MEDICAL HISTORY  Past Medical History:   Diagnosis Date    Alzheimer disease (HCC)     Anxiety     Arthritis     Cancer (HCC)     Cataract     Heart murmur     Hyperlipidemia 8/5/2023    Hypertension     Urinary tract infection      SURGICAL HISTORY  Past Surgical History:   Procedure Laterality Date    ABDOMINAL HYSTERECTOMY TOTAL      ARTHROPLASTY       FAMILY HISTORY  No family history noted.     SOCIAL HISTORY   reports that she has never smoked. She has never used smokeless tobacco. She reports that she does not currently use alcohol  after a past usage of about 0.6 oz of alcohol per week. She reports that she does not use drugs.    CURRENT MEDICATIONS  Previous Medications    ACETAMINOPHEN EXTRA STRENGTH 500 MG TAB    TAKE 2 TABS BY MOUTH THREETIMES DAILY. NTE 3000MG OF ACETAMINOPHEN/DAY    ALPRAZOLAM (XANAX) 0.25 MG TAB    Take 1 Tablet by mouth every evening for 60 days.    ATORVASTATIN (LIPITOR) 20 MG TAB    Take 1 Tablet by mouth at bedtime.    BISMUTH SUBSALICYLATE (PEPTO-BISMOL) 262 MG/15ML SUSPENSION    Take 30 mL by mouth every 6 hours as needed (nausea vomiting diarrhea).    CHOLECALCIFEROL (VITAMIN D) 125 MCG (5000 UT) CAP    Take 1 Capsule by mouth every day.    DIVALPROEX (DEPAKOTE SPRINKLE) 125 MG CAPSULE DELAYED RELEASE SPRINKLE    Take 1 Capsule by mouth in the morning, at noon, and at bedtime.    ELASTIC BANDAGES & SUPPORTS (MEDICAL COMPRESSION STOCKINGS) MISC    Apply compression stocking in AM and remove at HS.    ESCITALOPRAM (LEXAPRO) 10 MG TAB    Take 1 Tablet by mouth every day.    FUROSEMIDE (LASIX) 20 MG TAB    Take 0.5 Tablets by mouth 1 time a day as needed (leg swelling).    MELATONIN 5 MG TAB    Take 1 Tablet by mouth every evening. May also take 1 Tablet at bedtime as needed (as needed after taking routine dose if ineffective).    MEMANTINE (NAMENDA) 5 MG TAB    Take 1 Tablet by mouth every day.    MIDODRINE (PROAMATINE) 10 MG TABLET    Take 1 Tablet by mouth in the morning, at noon, and at bedtime.    NUTRITIONAL SUPPLEMENTS (NUTRITIONAL SHAKE PLUS PROTEIN) LIQUID    Take 1 Container by mouth every day.    POLYETHYLENE GLYCOL 3350 (MIRALAX) 17 GM/SCOOP POWDER    Take 17 g by mouth 1 time a day as needed (constipation).    QUETIAPINE (SEROQUEL) 25 MG TAB    Take 1 Tablet by mouth at bedtime. May also take 1 Tablet 2 times a day as needed (agitation or anxiety).    TRAZODONE (DESYREL) 100 MG TAB    Take 1 Tablet by mouth every evening. Discontinue previous order    ZINC OXIDE 13 % CREAM    Apply thin layer twice daily  "to redness on buttocks.       ALLERGIES  Patient has no known allergies.    PHYSICAL EXAM  /56   Pulse (!) 59   Temp 36.6 °C (97.9 °F) (Temporal)   Resp 14   Ht 1.549 m (5' 1\")   Wt 61.2 kg (135 lb)   LMP 04/02/1994 (Approximate)   SpO2 97%   BMI 25.51 kg/m²     Constitutional: Well developed, Well nourished, Mild distress.   HENT: Normocephalic, Oropharynx moist, No oral exudates. See Skin.   Eyes: Pupils are 3 mm and reactive, Conjunctiva normal, No discharge.   Neck: Tenderness to the mid cervical spine, No stridor  Cardiovascular: Normal heart rate, Normal rhythm, No murmurs, equal pulses.   Pulmonary: Normal breath sounds, No respiratory distress, No wheezing, No rales, No rhonchi.  Chest: No chest wall tenderness or deformity.   Abdomen:Soft, No tenderness.   Back: No CVA tenderness.  No vertebral point tenderness musculoskeletal: Questionable tenderness to the left shoulder, Questionable tenderness to the right hip. No major deformities noted.  Skin: 1.5 cm laceration to the occiput, bleeding controlled, Warm, Dry, No erythema, No rash.   Neurologic: Alert & oriented x1 which is her baseline with dementia., Normal motor function,  No focal deficits noted.     DIAGNOSTIC STUDIES  LABS  Results for orders placed or performed during the hospital encounter of 12/21/24   CBC WITH DIFFERENTIAL    Collection Time: 12/21/24 12:34 PM   Result Value Ref Range    WBC 8.8 4.8 - 10.8 K/uL    RBC 3.74 (L) 4.20 - 5.40 M/uL    Hemoglobin 11.8 (L) 12.0 - 16.0 g/dL    Hematocrit 35.8 (L) 37.0 - 47.0 %    MCV 95.7 81.4 - 97.8 fL    MCH 31.6 27.0 - 33.0 pg    MCHC 33.0 32.2 - 35.5 g/dL    RDW 50.7 (H) 35.9 - 50.0 fL    Platelet Count 216 164 - 446 K/uL    MPV 9.6 9.0 - 12.9 fL    Neutrophils-Polys 85.50 (H) 44.00 - 72.00 %    Lymphocytes 5.60 (L) 22.00 - 41.00 %    Monocytes 8.20 0.00 - 13.40 %    Eosinophils 0.20 0.00 - 6.90 %    Basophils 0.20 0.00 - 1.80 %    Immature Granulocytes 0.30 0.00 - 0.90 %    Nucleated " RBC 0.00 0.00 - 0.20 /100 WBC    Neutrophils (Absolute) 7.54 (H) 1.82 - 7.42 K/uL    Lymphs (Absolute) 0.49 (L) 1.00 - 4.80 K/uL    Monos (Absolute) 0.72 0.00 - 0.85 K/uL    Eos (Absolute) 0.02 0.00 - 0.51 K/uL    Baso (Absolute) 0.02 0.00 - 0.12 K/uL    Immature Granulocytes (abs) 0.03 0.00 - 0.11 K/uL    NRBC (Absolute) 0.00 K/uL   COMP METABOLIC PANEL    Collection Time: 12/21/24 12:34 PM   Result Value Ref Range    Sodium 140 135 - 145 mmol/L    Potassium 4.1 3.6 - 5.5 mmol/L    Chloride 106 96 - 112 mmol/L    Co2 25 20 - 33 mmol/L    Anion Gap 9.0 7.0 - 16.0    Glucose 95 65 - 99 mg/dL    Bun 17 8 - 22 mg/dL    Creatinine 0.69 0.50 - 1.40 mg/dL    Calcium 9.4 8.5 - 10.5 mg/dL    Correct Calcium 9.8 8.5 - 10.5 mg/dL    AST(SGOT) 22 12 - 45 U/L    ALT(SGPT) 15 2 - 50 U/L    Alkaline Phosphatase 70 30 - 99 U/L    Total Bilirubin 0.3 0.1 - 1.5 mg/dL    Albumin 3.5 3.2 - 4.9 g/dL    Total Protein 6.0 6.0 - 8.2 g/dL    Globulin 2.5 1.9 - 3.5 g/dL    A-G Ratio 1.4 g/dL   ESTIMATED GFR    Collection Time: 12/21/24 12:34 PM   Result Value Ref Range    GFR (CKD-EPI) 87 >60 mL/min/1.73 m 2   URINALYSIS (UA)    Collection Time: 12/21/24  2:10 PM    Specimen: Urine   Result Value Ref Range    Color Yellow     Character Cloudy (A)     Specific Gravity 1.020 <1.035    Ph 7.0 5.0 - 8.0    Glucose Negative Negative mg/dL    Ketones Trace (A) Negative mg/dL    Protein Negative Negative mg/dL    Bilirubin Negative Negative    Urobilinogen, Urine 1.0 <=1.0 EU/dL    Nitrite Positive (A) Negative    Leukocyte Esterase Negative Negative    Occult Blood Negative Negative    Micro Urine Req Microscopic    URINE MICROSCOPIC (W/UA)    Collection Time: 12/21/24  2:10 PM   Result Value Ref Range    WBC 0-2 /hpf    RBC 0-2 0 - 2 /hpf    Bacteria Many (A) None /hpf    Epithelial Cells 3-5 0 - 5 /hpf    Urine Casts 3-5 (A) 0 - 2 /lpf   I have independently interpreted the above labs.      RADIOLOGY/PROCEDURES   The attending emergency  physician has independently interpreted the diagnostic imaging associated with this visit and am waiting the final reading from the radiologist.   My preliminary interpretation is a follows: CT head no obvious intracranial hemorrhage.    Radiologist interpretation:  DX-ELBOW-COMPLETE 3+ LEFT   Final Result         No definite acute osseous abnormality but the evaluation limited due to osseous demineralization.      DX-SHOULDER 2+ LEFT   Final Result         1. No acute osseous abnormality.      DX-HIP-UNILATERAL-WITH PELVIS-1 VIEW RIGHT   Final Result      1.  No RIGHT hip or pelvic fracture.   2.  Moderate degenerative change of both hips.   3.  Osteopenia.      CT-CSPINE WITHOUT PLUS RECONS   Final Result         1. No acute fracture from C1 through T1 is visualized.      2. Mild anterolisthesis of C5-6 and C7-T1.         CT-HEAD W/O   Final Result         1. No acute intracranial abnormality. No evidence of acute intracranial hemorrhage or mass lesion.                             Laceration Repair Procedure    Indication: Laceration    Location/Description: Occiput     Procedure: The patient was placed in the appropriate position and anesthesia around the laceration was obtained by infiltration using 1% Lidocaine with epinephrine. The area was then cleansed with betadine and draped in a sterile fashion. The laceration was closed with staples. There were no additional lacerations requiring repair. The wound area was then dressed with a sterile dressing.      Total repaired wound length: 1.5 cm.     Other Items: Staple count: 4    The patient tolerated the procedure well.    Complications: None       COURSE & MEDICAL DECISION MAKING     ASSESSMENT, COURSE AND PLAN  Care Narrative:     12:17 PM - Patient seen and evaluated at charge desk. Discussed the plan of care, including ordering imaging and labs to further evaluate. Patient verbalizes understanding and agreement to this plan of care. Patient will be treated  with lidocaine-epinephrine 1%-1:463670 injection 20 mL for her symptoms. Ordered for DX-shoulder 2+ left, DX-elbow-complete 3+ left, DX-Hip-unilateral-with pelvis-1 view right, CT-Head w/o, CT-Cspine w/o  to evaluate. Differential diagnoses include, but are not limited to: Intracranial hemorrhage, scalp laceration, contusion, fracture.     1:12 PM - Patient was reevaluated at bedside. Discussed the plan for a laceration repair. Patient verbalizes understanding and agreement to this plan of care. The patient was medicated with lidocaine-epinephrine 1%-1:[id number] injection 20 mL.     1:19 PM - Patient was reevaluated at bedside. The patient's granddaughter is present at bedside. I updated her on the patient's case and plan of care.     1:36 PM - Patient was reevaluated at bedside. Performed a Laceration Repair Procedure at this time. See procedure note above.     2:10 PM - Ordered Urine microscopic to further evaluate.     2:41 PM - Patient was reevaluated at bedside. I updated the patient's daughter and discussed lab and radiology results, informing them that the patient has a UTI. I discussed plan for discharge with antibiotics and follow up as outlined below. The patient is stable for discharge at this time and will return for any new or worsening symptoms. Patient and family verbalize their understanding and support with my plan for discharge.          DISPOSITION AND DISCUSSIONS  I have discussed management of the patient with the following physicians and GRIS's:  None    Discussion of management with other [facility] or appropriate source(s): None       Barriers to care at this time, including but not limited to:  None .     Decision tools and prescription drugs considered including, but not limited to: Antibiotics Omnicef .      The patient will return for new or worsening symptoms and is stable at the time of discharge.    The patient is referred to a primary physician for blood pressure management, diabetic screening,  and for all other preventative health concerns.    Problem list:  From #1 scalp laceration patient presents emergency department after having a ground-level fall.  She had a small scalp laceration.  Based off her age and mid cervical spine tenderness CT head and cervical spine was obtained this is negative for fracture or intracranial hemorrhage.  Her scalp wound has been closed as described above.    Problem #2 left shoulder pain patient did present with some questionable left shoulder and elbow pain x-rays were obtained and there is no fracture.    Problem #3 right hip pain x-rays were obtained and there is no hip fracture.    Problem #4 I think the patient may be having increased weakness and confusion secondary to a urinary tract infection she is positive for bacteria as well as nitrites we will start her on Omnicef for this.  Patient does not appear to be septic.    DISPOSITION:  Patient will be discharged home in stable condition.    FOLLOW UP:  QUENTIN Qureshi  [address] [facility] [id number]  [phone]    Schedule an appointment as soon as possible for a visit in 1 week  For staple removal the need to be removed in 5 to 7 days.      OUTPATIENT MEDICATIONS:  New Prescriptions    CEFDINIR (OMNICEF) 300 MG CAP    Take 1 Capsule by mouth 2 times a day for 5 days.      FINAL DIAGNOSIS  1. Fall, initial encounter    2. Laceration of scalp, initial encounter    3. Acute UTI    4.      Laceration Repair Procedure     Lory GALICIA (Rodolfo), am scribing for, and in the presence of, ANGELICA Campo*.    Electronically signed by: Lory Terrell (Rodolfo), 12/21/2024    Jordan GALICIA M.* personally performed the services described in this documentation, as scribed by Lory Terrell in my presence, and it is both accurate and complete.      The note accurately reflects work and decisions made by me.  Jordan Rome M.D.  12/21/2024  3:03 PM

## 2024-12-21 NOTE — DISCHARGE INSTRUCTIONS
You can wash your hair with shampoo.  You can apply a little antibiotic ointment over the staples.  The staples need to be removed in 5 to 7 days.  Take all your antibiotics until gone.  Return if you have increasing or new or different headache, confusion or unilateral weakness.

## 2025-01-07 ENCOUNTER — APPOINTMENT (OUTPATIENT)
Dept: RADIOLOGY | Facility: MEDICAL CENTER | Age: 80
End: 2025-01-07
Attending: EMERGENCY MEDICINE
Payer: MEDICARE

## 2025-01-07 ENCOUNTER — HOSPITAL ENCOUNTER (EMERGENCY)
Facility: MEDICAL CENTER | Age: 80
End: 2025-01-07
Attending: EMERGENCY MEDICINE
Payer: MEDICARE

## 2025-01-07 VITALS
DIASTOLIC BLOOD PRESSURE: 58 MMHG | OXYGEN SATURATION: 95 % | HEART RATE: 59 BPM | RESPIRATION RATE: 16 BRPM | SYSTOLIC BLOOD PRESSURE: 127 MMHG | HEIGHT: 61 IN | WEIGHT: 135 LBS | BODY MASS INDEX: 25.49 KG/M2 | TEMPERATURE: 97.4 F

## 2025-01-07 DIAGNOSIS — S09.8XXA BLUNT HEAD TRAUMA, INITIAL ENCOUNTER: ICD-10-CM

## 2025-01-07 DIAGNOSIS — S01.81XA LACERATION OF FOREHEAD, INITIAL ENCOUNTER: ICD-10-CM
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
## 2025-01-07 PROCEDURE — 99284 EMERGENCY DEPT VISIT MOD MDM: CPT

## 2025-01-07 PROCEDURE — 70450 CT HEAD/BRAIN W/O DYE: CPT

## 2025-01-07 PROCEDURE — 304217 HCHG IRRIGATION SYSTEM

## 2025-01-07 PROCEDURE — 700101 HCHG RX REV CODE 250: Performed by: EMERGENCY MEDICINE

## 2025-01-07 PROCEDURE — 303353 HCHG DERMABOND SKIN ADHESIVE

## 2025-01-07 PROCEDURE — 72125 CT NECK SPINE W/O DYE: CPT

## 2025-01-07 PROCEDURE — 304999 HCHG REPAIR-SIMPLE/INTERMED LEVEL 1

## 2025-01-07 RX ADMIN — Medication 6 ML: at 08:03

## 2025-01-07 ASSESSMENT — PAIN DESCRIPTION - PAIN TYPE: TYPE: ACUTE PAIN

## 2025-01-07 ASSESSMENT — FIBROSIS 4 INDEX: FIB4 SCORE: 2.13

## 2025-01-07 NOTE — ED NOTES
Report given to Valencia, Patient will be going back to group home. Patient left in stable condition. Report given to MEENAKSHI, Daughter Lili and Elizabeth .

## 2025-01-07 NOTE — ED PROVIDER NOTES
ER Provider Note    Scribed for Jagdish Peraza M.D. by Wily Becerra. 1/7/2025   7:49 AM    Primary Care Provider: QUENTIN Qureshi    CHIEF COMPLAINT  Chief Complaint   Patient presents with    GLF     Pt BIBA from group home where pt had GLF out of bed this morning striking her head, (-) LOC, (-) thinners. 3cm laceration to left eyebrow, pressure dressing in place on arrival.   A&Ox1 at baseline per EMS.     EXTERNAL RECORDS REVIEWED  Outpatient labs & studies Geriatric medicine visit yesterday for Alzheimer's    HPI/ROS    LIMITATION TO HISTORY   Select: Altered mental status / Confusion  OUTSIDE HISTORIAN(S):  EMS provided most history    Jennifer Vuong is a 79 y.o. female who presents to the ED for evaluation of TBI onset earlier today. Patient is currently a resident at a group home, today she fell out of bed and struck her head. There is a 3 cm laceration to the left frontal area. There was no loss of consciousness, she does not take any blood thinners. Baseline A&O x 1. Vitals stable en route.     PAST MEDICAL HISTORY  Past Medical History:   Diagnosis Date    Alzheimer disease (HCC)     Anxiety     Arthritis     Cancer (HCC)     Cataract     Heart murmur     Hyperlipidemia 8/5/2023    Hypertension     Urinary tract infection        SURGICAL HISTORY  Past Surgical History:   Procedure Laterality Date    ABDOMINAL HYSTERECTOMY TOTAL      ARTHROPLASTY         FAMILY HISTORY  History reviewed. No pertinent family history.    SOCIAL HISTORY   reports that she has never smoked. She has never used smokeless tobacco. She reports that she does not currently use alcohol after a past usage of about 0.6 oz of alcohol per week. She reports that she does not use drugs.    CURRENT MEDICATIONS  Previous Medications    ACETAMINOPHEN EXTRA STRENGTH 500 MG TAB    TAKE 2 TABS BY MOUTH THREETIMES DAILY. NTE 3000MG OF ACETAMINOPHEN/DAY    ALPRAZOLAM (XANAX) 0.25 MG TAB    Take 1 Tablet by mouth every evening  "for 60 days.    ATORVASTATIN (LIPITOR) 20 MG TAB    Take 1 Tablet by mouth at bedtime.    BISMUTH SUBSALICYLATE (PEPTO-BISMOL) 262 MG/15ML SUSPENSION    Take 30 mL by mouth every 6 hours as needed (nausea vomiting diarrhea).    CHOLECALCIFEROL (VITAMIN D) 125 MCG (5000 UT) CAP    Take 1 Capsule by mouth every day.    DIVALPROEX (DEPAKOTE SPRINKLE) 125 MG CAPSULE DELAYED RELEASE SPRINKLE    Take 1 Capsule by mouth in the morning, at noon, and at bedtime.    ELASTIC BANDAGES & SUPPORTS (MEDICAL COMPRESSION STOCKINGS) MISC    Apply compression stocking in AM and remove at HS.    ESCITALOPRAM (LEXAPRO) 10 MG TAB    TAKE 1 TABLET BY MOUTH ONCE DAILY    FUROSEMIDE (LASIX) 20 MG TAB    Take 0.5 Tablets by mouth 1 time a day as needed (leg swelling).    GUAIFENESIN (ROBITUSSIN) 100 MG/5ML LIQUID    Take 10 mL by mouth every four hours as needed for Cough.    MELATONIN (MELATONIN MAXIMUM STRENGTH) 5 MG TAB    TAKE 1 TABLETBY MOUTH IN THE EVENING. MAY TAKE 1 ADD'L TAB AT BEDTIME AS NEEDED IF INEFFECTIVE    MEMANTINE (NAMENDA) 5 MG TAB    Take 1 Tablet by mouth every day.    MIDODRINE (PROAMATINE) 10 MG TABLET    Take 1 Tablet by mouth in the morning, at noon, and at bedtime.    NUTRITIONAL SUPPLEMENTS (NUTRITIONAL SHAKE PLUS PROTEIN) LIQUID    Take 1 Container by mouth every day.    POLYETHYLENE GLYCOL 3350 (MIRALAX) 17 GM/SCOOP POWDER    Take 17 g by mouth 1 time a day as needed (constipation).    QUETIAPINE (SEROQUEL) 25 MG TAB    Take 1 Tablet by mouth at bedtime. May also take 1 Tablet 2 times a day as needed (agitation or anxiety).    TRAZODONE (DESYREL) 100 MG TAB    Take 1 Tablet by mouth every evening. Discontinue previous order    ZINC OXIDE 13 % CREAM    Apply thin layer twice daily to redness on buttocks.       ALLERGIES  No Known Allergies     PHYSICAL EXAM  Ht 1.549 m (5' 1\")   Wt 61.2 kg (135 lb)   LMP 04/02/1994 (Approximate)   BMI 25.51 kg/m²    Nursing note and vitals reviewed.  Constitutional: " Well-developed and well-nourished. No distress.   HENT: Head is normocephalic. Oropharynx is clear and moist without exudate or erythema. Laceration to forehead 3.5 cm in length   Eyes: Pupils are equal, round, and reactive to light. Conjunctiva are normal.   Cardiovascular: Normal rate and regular rhythm. No murmur heard. Normal radial pulses.  Pulmonary/Chest: Breath sounds normal. No wheezes or rales.   Abdominal: Soft and non-tender. No distention    Musculoskeletal: Extremities exhibit normal range of motion without edema or tenderness.   Neurological: A&O x 1. No focal deficits noted.  Skin: Skin is warm and dry. No rash.   Psychiatric: Normal mood and affect. Appropriate for clinical situation    DIAGNOSTIC STUDIES    Radiology:   This attending emergency physician has independently interpreted the diagnostic imaging associated with this visit and is awaiting the final reading from the radiologist.   Preliminary interpretation is a follows: Negative for acute injury  Radiologist interpretation:   CT-CSPINE WITHOUT PLUS RECONS   Final Result      No acute fracture or dislocation of the cervical spine.      CT-HEAD W/O   Final Result      1.  Mild cerebral atrophy and chronic microvascular ischemic type changes.   2.  No acute intracranial abnormality.                  PROCEDURES    Laceration Repair Procedure Note  Indication: Laceration  Procedure: The patient was placed in the appropriate position and anesthesia around the laceration was obtained by infiltration using LET gel. The wound was highly contaminated .The area was copiously irrigated with normal saline. The laceration was closed with Dermabond. There were no additional lacerations requiring repair. The wound area was then dressed with a sterile dressing.    Total repaired wound length: 3.5 cm.   Other Items: None  The patient tolerated the procedure well.  Complications: None    INITIAL ASSESSMENT AND PLAN    7:49 AM - Patient was evaluated at  bedside. Ordered for CT-head w/o and CT-C-spine w/o to evaluate. Patient verbalizes understanding and support with my plan of care.  Differential diagnoses include but not limited to: intracranial hemorrhage, laceration     8:30 AM - CT imaging is reassuring, plan for laceration repair.     8:53 AM - Laceration repair performed, see above procedure note. Patient will now be discharged at this time. She will be taken back to her group home.     ED OBS: No; Patient does not meet criteria for ED Observation.      DISPOSITION AND DISCUSSIONS    I have discussed management of the patient with the following physicians and GRIS's:  None    Discussion of management with other [facility] or appropriate source(s): None     Barriers to care at this time, including but not limited to: None    DISPOSITION:  Patient will be discharged home in stable condition.    FOLLOW UP:  QUENTIN Qureshi  [address] [facility] [address]  [phone]    Schedule an appointment as soon as possible for a visit       [facility], Emergency Dept  [address] [facility] [id number] [phone]        FINAL DIAGNOSIS  1. Fall, initial encounter    2. Laceration of forehead, initial encounter    3. Blunt head trauma, initial encounter      IWily (Rodolfo), am scribing for, and in the presence of, Jagdish Peraza M.D..    Electronically signed by: Wily Becerra (Rodolfo), 1/7/2025    IJagdish M.D. personally performed the services described in this documentation, as scribed by Wily Becerra in my presence, and it is both accurate and complete.      The note accurately reflects work and decisions made by me.  Jagdish Peraza M.D.  1/7/2025  1:13 PM

## 2025-01-07 NOTE — DISCHARGE PLANNING
Pt ready for discharge to [facility] [address], [phone].  [facility] scheduled for 1100 and GH and BSN updated.      PCS scanned to media tab.

## 2025-01-07 NOTE — ED NOTES
Group home called. Spoke to Elizabeth. Patient daughter is out of town and can not come pick her up.   Report given to Elizabeth. [phone]   updated.

## 2025-01-07 NOTE — ED TRIAGE NOTES
Chief Complaint   Patient presents with    GLF     Pt BIBA from group [place] where pt had GLF out of bed this morning striking her head, (-) LOC, (-) thinners. 3cm laceration to left eyebrow, pressure dressing in place on arrival.   A&Ox1 at baseline per EMS.

## 2025-01-07 NOTE — ED NOTES
Elizabeth at [facility] called to update that patient will be coming back at 11:00 am

## 2025-01-22 PROBLEM — S51.812A SKIN TEAR OF FOREARM WITHOUT COMPLICATION, LEFT, INITIAL ENCOUNTER: Status: ACTIVE | Noted: 2025-01-22
